# Patient Record
Sex: FEMALE | Race: WHITE | NOT HISPANIC OR LATINO | Employment: FULL TIME | URBAN - METROPOLITAN AREA
[De-identification: names, ages, dates, MRNs, and addresses within clinical notes are randomized per-mention and may not be internally consistent; named-entity substitution may affect disease eponyms.]

---

## 2018-03-02 ENCOUNTER — OFFICE VISIT (OUTPATIENT)
Dept: MEDICAL GROUP | Facility: LAB | Age: 23
End: 2018-03-02
Payer: COMMERCIAL

## 2018-03-02 VITALS
HEIGHT: 65 IN | SYSTOLIC BLOOD PRESSURE: 102 MMHG | BODY MASS INDEX: 26.69 KG/M2 | TEMPERATURE: 98 F | OXYGEN SATURATION: 96 % | WEIGHT: 160.2 LBS | HEART RATE: 76 BPM | RESPIRATION RATE: 14 BRPM | DIASTOLIC BLOOD PRESSURE: 68 MMHG

## 2018-03-02 DIAGNOSIS — B00.89 HERPETIC WHITLOW: ICD-10-CM

## 2018-03-02 DIAGNOSIS — R53.83 OTHER FATIGUE: ICD-10-CM

## 2018-03-02 DIAGNOSIS — G43.109 MIGRAINE WITH AURA AND WITHOUT STATUS MIGRAINOSUS, NOT INTRACTABLE: ICD-10-CM

## 2018-03-02 DIAGNOSIS — T78.40XD ALLERGIC REACTION TO DRUG, SUBSEQUENT ENCOUNTER: ICD-10-CM

## 2018-03-02 DIAGNOSIS — E55.9 VITAMIN D DEFICIENCY: ICD-10-CM

## 2018-03-02 DIAGNOSIS — E03.8 SUBCLINICAL HYPOTHYROIDISM: ICD-10-CM

## 2018-03-02 PROCEDURE — 99204 OFFICE O/P NEW MOD 45 MIN: CPT | Performed by: PHYSICIAN ASSISTANT

## 2018-03-02 RX ORDER — ACYCLOVIR 400 MG/1
400 TABLET ORAL 3 TIMES DAILY
Qty: 21 TAB | Refills: 3 | Status: SHIPPED | OUTPATIENT
Start: 2018-03-02 | End: 2018-03-09

## 2018-03-02 ASSESSMENT — PATIENT HEALTH QUESTIONNAIRE - PHQ9: CLINICAL INTERPRETATION OF PHQ2 SCORE: 0

## 2018-03-02 NOTE — ASSESSMENT & PLAN NOTE
Migraines started at age 10ish.   With aura.   Flares about 1 time monthly.   Treating with tylenol and advil.   Hx imitrex- CP and SOB.   Good at drinking water- almost 1 gal per day.   Denies tingling, numbness, muscle weakness.

## 2018-03-02 NOTE — ASSESSMENT & PLAN NOTE
Summer 2016 first noticed it. Blisters on right middle lateral finger and that finger and dorsal hand swollen with burning pain.   Has had 7-8 flares since then.   Has never treated them   Flares with more stress.   No hx of cold sores or herpes.

## 2018-03-02 NOTE — PROGRESS NOTES
Chief Complaint   Patient presents with   • Establish Care   • Thyroid Problem     lab results     HPI:   Veronica Reyes is a 23 y.o. female here to establish care and to discuss her thyroid and right finger.     Migraine with aura and without status migrainosus, not intractable  Migraines started at age 10ish.   With aura.   Flares about 1 time monthly.   Treating with tylenol and advil.   Hx imitrex- CP and SOB.   Good at drinking water- almost 1 gal per day.   Denies tingling, numbness, muscle weakness.     Herpetic karin  Summer 2016 first noticed it. Blisters on right middle lateral finger and that finger and dorsal hand swollen with burning pain.   Has had 7-8 flares since then.   Has never treated them   Flares with more stress.   No hx of cold sores or herpes.     Subclinical hypothyroidism  2/20/18 TSH 5.01, free T4 0.90  She stated she was asked to get labs done, she requested her thyroid be tested since she was fatigued sometimes and having a hard time losing weight.   Diet: pretty healthy, well balanced.   Exercise: 4 times weekly lots of cardio and has been for over 1 year.   Other labs included CBC, CMP and LP all normal.  She was asked to repeat her labs since there was an error.   And now on the lab she is not sure which one was repeated but she believes there was a mistake with her thyroid lab.   She is occasionally cold when others are not.   Denies diarrhea, constipation, intolerance to hot, rash, palpitation     Current medicines (including changes today)  Current Outpatient Prescriptions   Medication Sig Dispense Refill   • acyclovir (ZOVIRAX) 400 MG tablet Take 1 Tab by mouth 3 times a day for 7 days. 21 Tab 3     No current facility-administered medications for this visit.      She  has a past medical history of Acne; Chlamydia infection (8/15/11); and Migraine. She also has no past medical history of ASTHMA or Diabetes.  She  has no past surgical history on file.  Social History   Substance Use  "Topics   • Smoking status: Never Smoker   • Smokeless tobacco: Never Used   • Alcohol use Yes      Comment: 2 times weekly     Social History     Social History Narrative   • No narrative on file     Family History   Problem Relation Age of Onset   • Genetic Father      epilepsy/migraines   • Other Father      kidney stones   • Cancer Maternal Grandfather      skin cancer   • Cancer Paternal Grandfather      colon   • Heart Disease Paternal Grandfather      CAD   • Hypertension Paternal Grandfather    • Diabetes Neg Hx    • Stroke Neg Hx      Family Status   Relation Status   • Mother Alive   • Father Alive    seizures as a child   • Maternal Grandfather Alive   • Paternal Grandfather    • Maternal Grandmother    • Paternal Grandmother Alive   • Brother Alive   • Neg Hx        ROS  Constitutional: Negative for fever, chills, weight loss  HENT: Negative for ear pain, nosebleeds, congestion, sore throat and neck pain.   Eyes: Negative for blurred vision.   Respiratory: Negative for cough, sputum production, shortness of breath and wheezing.   Cardiovascular: Negative for chest pain, palpitations, orthopnea and leg swelling.   Gastrointestinal: Negative for heartburn, nausea, vomiting and abdominal pain.   Genitourinary: Negative for dysuria, urgency and frequency.   Musculoskeletal: Negative for myalgias, back pain and joint pain.   Skin: Negative for rash and itching.   Neurological: Negative for dizziness, tingling, tremors, sensory change, focal weakness and headaches.   Endo/Heme/Allergies: Does not bruise/bleed easily.   Psychiatric/Behavioral: Negative for depression, anxiety, or memory loss.   All other systems reviewed and are negative except as in HPI.     Objective:     Blood pressure 102/68, pulse 76, temperature 36.7 °C (98 °F), resp. rate 14, height 1.651 m (5' 5\"), weight 72.7 kg (160 lb 3.2 oz), last menstrual period 2018, SpO2 96 %, not currently breastfeeding. Body mass index is " 26.66 kg/m².  Physical Exam:    Constitutional: Alert, no distress.  Skin: Warm, dry, good turgor, right lateral middle finger with clustering of 1-2mm vesicals with edema in finger and on dorsal hand. No warmth, erythema, drainage, pustules.   Eye: PERRLA, conjunctiva clear, lids normal.  ENMT: Lips without lesions, good dentition, oropharynx clear.  Neck: Trachea midline, no masses, no thyromegaly.  Respiratory: Unlabored respiratory effort, lungs clear to auscultation, no wheezes, no ronchi.  Cardiovascular: Normal S1, S2, no murmur, no edema.  Abdomen: Soft, non-tender, no masses, no hepatosplenomegaly.  Psych: Alert and oriented x3, normal affect and mood.    Assessment and Plan:   The following treatment plan was discussed     1. Migraine with aura and without status migrainosus, not intractable  Stable, continue current treatment    3. Vitamin D deficiency  Labs ordered, will call for results  - VITAMIN D,25 HYDROXY; Future    4. Other fatigue  Uncertain etiology, we will repeat labs for thyroid function and vit D.   Otherwise discussed diet in depth and continue to exercise.     5. Subclinical hypothyroidism  New dx and pt would like repeat of labs. We will f/u after labs and if function is better we will discuss weight management options.   - TSH; Future  - FREE THYROXINE; Future    6. Herpetic karin  New dx, discussed etiology.   I was unable to get any fluid out to be tested.   We will try acyclovir although this episode may be too late to start treatment.   - acyclovir (ZOVIRAX) 400 MG tablet; Take 1 Tab by mouth 3 times a day for 7 days.  Dispense: 21 Tab; Refill: 3    Greater than 50% of 45 minutes was spent in face-to-face care and coordination regarding thyroid function, reviewing labs, rash on finger, migraines.     Records requested.  Followup: Return in about 4 weeks (around 3/30/2018) for labs and weight management.           Please note that this dictation was created using voice recognition  software. I have made every reasonable attempt to correct obvious errors, but I expect that there are errors of grammar and possibly content that I did not discover before finalizing the note.

## 2018-03-02 NOTE — ASSESSMENT & PLAN NOTE
2/20/18 TSH 5.01, free T4 0.90  She stated she was asked to get labs done, she requested her thyroid be tested since she was fatigued sometimes and having a hard time losing weight.   Diet: pretty healthy, well balanced.   Exercise: 4 times weekly lots of cardio and has been for over 1 year.   Other labs included CBC, CMP and LP all normal.  She was asked to repeat her labs since there was an error.   And now on the lab she is not sure which one was repeated but she believes there was a mistake with her thyroid lab.   She is occasionally cold when others are not.   Denies diarrhea, constipation, intolerance to hot, rash, palpitation

## 2018-03-03 LAB
25(OH)D3+25(OH)D2 SERPL-MCNC: 29.7 NG/ML (ref 30–100)
T4 FREE SERPL-MCNC: 1.17 NG/DL (ref 0.82–1.77)
TSH SERPL DL<=0.005 MIU/L-ACNC: 4.98 UIU/ML (ref 0.45–4.5)

## 2018-04-03 ENCOUNTER — HOSPITAL ENCOUNTER (OUTPATIENT)
Facility: MEDICAL CENTER | Age: 23
End: 2018-04-03
Attending: PHYSICIAN ASSISTANT
Payer: COMMERCIAL

## 2018-04-03 ENCOUNTER — OFFICE VISIT (OUTPATIENT)
Dept: MEDICAL GROUP | Facility: LAB | Age: 23
End: 2018-04-03
Payer: COMMERCIAL

## 2018-04-03 VITALS
SYSTOLIC BLOOD PRESSURE: 102 MMHG | DIASTOLIC BLOOD PRESSURE: 68 MMHG | HEART RATE: 72 BPM | OXYGEN SATURATION: 96 % | BODY MASS INDEX: 26.33 KG/M2 | RESPIRATION RATE: 12 BRPM | HEIGHT: 65 IN | WEIGHT: 158 LBS | TEMPERATURE: 98.7 F

## 2018-04-03 DIAGNOSIS — Z12.4 SCREENING FOR CERVICAL CANCER: ICD-10-CM

## 2018-04-03 DIAGNOSIS — Z23 NEED FOR HPV VACCINATION: ICD-10-CM

## 2018-04-03 DIAGNOSIS — Z11.8 SCREENING FOR CHLAMYDIAL DISEASE: ICD-10-CM

## 2018-04-03 DIAGNOSIS — Z01.419 ENCOUNTER FOR GYNECOLOGICAL EXAMINATION: ICD-10-CM

## 2018-04-03 PROCEDURE — 87591 N.GONORRHOEAE DNA AMP PROB: CPT

## 2018-04-03 PROCEDURE — 87491 CHLMYD TRACH DNA AMP PROBE: CPT

## 2018-04-03 PROCEDURE — 88175 CYTOPATH C/V AUTO FLUID REDO: CPT

## 2018-04-03 PROCEDURE — 99395 PREV VISIT EST AGE 18-39: CPT | Mod: 25 | Performed by: PHYSICIAN ASSISTANT

## 2018-04-03 PROCEDURE — 90651 9VHPV VACCINE 2/3 DOSE IM: CPT | Performed by: PHYSICIAN ASSISTANT

## 2018-04-03 PROCEDURE — 90471 IMMUNIZATION ADMIN: CPT | Performed by: PHYSICIAN ASSISTANT

## 2018-04-03 ASSESSMENT — PAIN SCALES - GENERAL: PAINLEVEL: NO PAIN

## 2018-04-03 NOTE — PROGRESS NOTES
SUBJECTIVE: 23 y.o. female for annual routine gynecologic exam  Chief Complaint   Patient presents with   • Gynecologic Exam       Obstetric History       T0      L0     SAB0   TAB0   Ectopic0   Molar0   Multiple0   Live Births0       Last Pap: 13  History   Sexual Activity   • Sexual activity: Yes   • Partners: Male   • Birth control/ protection: Condom     H/O Abnormal Pap no  She is sexually active with long time partner, she is not on birth control, they are not preventing pregnancy but not trying either.   She has not been taking prenatal vitamins.   LMP Date: 18     She  reports that she has never smoked. She has never used smokeless tobacco.      Allergies: Bactrim; Imitrex [sumatriptan succinate]; Morphine sulfate; and Tdap [dipth, acell pertus, tetanus]     ROS:    Menses every month with 5 days  Bleeding.   Cramping is mild.   She does not take OTC analgesics for cramping  No significant bloating/fluid retention, pelvic pain, or dyspareunia. No vaginal discharge   No breast tenderness, mass, nipple discharge, changes in size or contour, or abnormal cyclic discomfort.  Reports no menopause symptoms of hot flashes, night sweats, sleep disruption, mood changes.Denies vaginal dryness.   No urinary tract symptoms, no incontinence.   No abdominal pain, change in bowel habits, black or bloody stools.    No unusual headaches, no visual changes, menstrual migraines   No prolonged cough. No dyspnea or chest pain on exertion.  No depression, labile mood, anxiety, libido changes, insomnia.  No polydipsia, polyuria, temperature intolerance.  No new/concerning skin lesions, concerns.     Exercise: moderate regular exercise program  Preventive Care:  UTD per pt other than HPV.   HPV #1 13 (has not had #2)  Pap- 13    Current medicines (including changes today)  No current outpatient prescriptions on file.     No current facility-administered medications for this visit.      She  has a past  "medical history of Acne; Chlamydia infection (8/15/11); and Migraine. She also has no past medical history of ASTHMA or Diabetes.  She  has no past surgical history on file.     Family History:   Family History   Problem Relation Age of Onset   • Genetic Father      epilepsy/migraines   • Other Father      kidney stones   • Cancer Maternal Grandfather      skin cancer   • Cancer Paternal Grandfather      colon   • Heart Disease Paternal Grandfather      CAD   • Hypertension Paternal Grandfather    • Diabetes Neg Hx    • Stroke Neg Hx           OBJECTIVE:   /68   Pulse 72   Temp 37.1 °C (98.7 °F)   Resp 12   Ht 1.651 m (5' 5\")   Wt 71.7 kg (158 lb)   LMP 03/24/2018   SpO2 96%   Breastfeeding? No   BMI 26.29 kg/m²   Body mass index is 26.29 kg/m².    HEAD AND NECK:  Ears normal.  Throat, oral cavity and tongue normal.  Neck supple. No adenopathy or masses in the neck or supraclavicular regions.  No carotid bruits. No thyromegaly. NEURO: Cranial nerves are normal. DTR's normal and symmetric.  CHEST:  Clear, good air entry, no wheezes or rales. HEART:  Regular rate and rhythm.  S1 and S2 normal.  No edema or JVD. ABDOMEN:  Soft without tenderness, guarding, mass or organomegaly.  No CVA tenderness or inguinal adenopathy. EXTREMITIES:  Extremities, reflexes and peripheral pulses are normal. SKIN: color normal, vascularity normal, no edema, temperature normal   No rashes or suspicious skin lesions noted.     Breast Exam: Performed with instruction during examination. No axillary lymphadenopathy, no skin changes, no dominant masses. No nipple retraction  Pelvic Exam -  Normal external genitalia with no lesions. Normal vaginal mucosa with normal rugation and scant discharge. Cervix with no visible lesions. No cervical motion tenderness. Uterus is normal sized with no masses. No adnexal tenderness or enlargement appreciated. Thin Prep Pap is obtained, vaginal swab is obtained and specimen(s) sent to " lab    <ASSESSMENT and PLAN>    1. Encounter for gynecological examination  Pap smear with HPV cotesting collected today  UTD per pt other than HPV.   HPV #1 5/8/13 (has not had #2)  Pap- 5/9/13  Recommend taking prenatal vitamins since they are not preventing pregnancy.     2. Screening for cervical cancer  Pap smear with HPV cotesting collected today    3. Screening for chlamydial disease  Pap smear with HPV cotesting collected today    4. Need for HPV  HPV #2 given today but this was 5 years after she had #1.   The ACIP recommends that if the vaccination series is interrupted for any length of time, it can be resumed without restarting the series.    Discussed  STD prevention, HIV risk factors and prevention, feminine hygiene, family planning choices, adequate intake of calcium and vitamin D, diet and exercise   Follow-up in 1 years for next Gyn exam and 3 years for next Pap.   Next office visit for recheck of chronic medical conditions is due in  1 year    Please note that this dictation was created using voice recognition software. I have made every reasonable attempt to correct obvious errors, but I expect that there are errors of grammar and possibly content that I did not discover before finalizing the note.

## 2018-04-04 DIAGNOSIS — Z01.419 ENCOUNTER FOR GYNECOLOGICAL EXAMINATION: ICD-10-CM

## 2018-04-04 DIAGNOSIS — Z12.4 SCREENING FOR CERVICAL CANCER: ICD-10-CM

## 2018-04-04 DIAGNOSIS — Z11.8 SCREENING FOR CHLAMYDIAL DISEASE: ICD-10-CM

## 2018-04-05 LAB
C TRACH DNA GENITAL QL NAA+PROBE: NEGATIVE
CYTOLOGY REG CYTOL: NORMAL
N GONORRHOEA DNA GENITAL QL NAA+PROBE: NEGATIVE
SPECIMEN SOURCE: NORMAL

## 2018-10-05 ENCOUNTER — TELEPHONE (OUTPATIENT)
Dept: MEDICAL GROUP | Facility: LAB | Age: 23
End: 2018-10-05

## 2018-10-05 DIAGNOSIS — Z23 NEED FOR HPV VACCINE: ICD-10-CM

## 2018-10-30 ENCOUNTER — APPOINTMENT (OUTPATIENT)
Dept: MEDICAL GROUP | Facility: MEDICAL CENTER | Age: 23
End: 2018-10-30
Payer: COMMERCIAL

## 2018-12-03 RX ORDER — ACYCLOVIR 400 MG/1
TABLET ORAL
Qty: 21 TAB | Refills: 0 | Status: SHIPPED | OUTPATIENT
Start: 2018-12-03 | End: 2019-04-26

## 2019-01-11 ENCOUNTER — HOSPITAL ENCOUNTER (OUTPATIENT)
Facility: MEDICAL CENTER | Age: 24
End: 2019-01-11
Attending: PHYSICIAN ASSISTANT
Payer: COMMERCIAL

## 2019-01-11 ENCOUNTER — OFFICE VISIT (OUTPATIENT)
Dept: URGENT CARE | Facility: CLINIC | Age: 24
End: 2019-01-11
Payer: COMMERCIAL

## 2019-01-11 VITALS
OXYGEN SATURATION: 94 % | HEIGHT: 65 IN | RESPIRATION RATE: 16 BRPM | DIASTOLIC BLOOD PRESSURE: 68 MMHG | SYSTOLIC BLOOD PRESSURE: 116 MMHG | WEIGHT: 158 LBS | TEMPERATURE: 98.3 F | BODY MASS INDEX: 26.33 KG/M2 | HEART RATE: 78 BPM

## 2019-01-11 DIAGNOSIS — N30.00 ACUTE CYSTITIS WITHOUT HEMATURIA: ICD-10-CM

## 2019-01-11 LAB
APPEARANCE UR: CLEAR
BILIRUB UR STRIP-MCNC: NEGATIVE MG/DL
COLOR UR AUTO: YELLOW
GLUCOSE UR STRIP.AUTO-MCNC: NEGATIVE MG/DL
KETONES UR STRIP.AUTO-MCNC: NEGATIVE MG/DL
LEUKOCYTE ESTERASE UR QL STRIP.AUTO: NORMAL
NITRITE UR QL STRIP.AUTO: NEGATIVE
PH UR STRIP.AUTO: 7 [PH] (ref 5–8)
PROT UR QL STRIP: NEGATIVE MG/DL
RBC UR QL AUTO: NORMAL
SP GR UR STRIP.AUTO: 1.02
UROBILINOGEN UR STRIP-MCNC: 0.2 MG/DL

## 2019-01-11 PROCEDURE — 87077 CULTURE AEROBIC IDENTIFY: CPT

## 2019-01-11 PROCEDURE — 99214 OFFICE O/P EST MOD 30 MIN: CPT | Performed by: PHYSICIAN ASSISTANT

## 2019-01-11 PROCEDURE — 87086 URINE CULTURE/COLONY COUNT: CPT

## 2019-01-11 PROCEDURE — 87186 SC STD MICRODIL/AGAR DIL: CPT

## 2019-01-11 PROCEDURE — 81002 URINALYSIS NONAUTO W/O SCOPE: CPT | Performed by: PHYSICIAN ASSISTANT

## 2019-01-11 RX ORDER — NITROFURANTOIN 25; 75 MG/1; MG/1
100 CAPSULE ORAL EVERY 12 HOURS
Qty: 10 CAP | Refills: 0 | Status: SHIPPED | OUTPATIENT
Start: 2019-01-11 | End: 2019-01-16

## 2019-01-11 ASSESSMENT — ENCOUNTER SYMPTOMS
PALPITATIONS: 0
CHILLS: 0
FLANK PAIN: 0
BACK PAIN: 0
COUGH: 0
SHORTNESS OF BREATH: 0
FEVER: 0

## 2019-01-11 NOTE — PROGRESS NOTES
Subjective:      Veronica Reyes is a 23 y.o. female who presents with UTI (X2 days)            UTI   This is a new problem. The current episode started in the past 7 days. The problem occurs constantly. Associated symptoms include urinary symptoms. Pertinent negatives include no chest pain, chills, coughing or fever. She has tried NSAIDs for the symptoms. The treatment provided no relief.       Review of Systems   Constitutional: Negative for chills and fever.   Respiratory: Negative for cough and shortness of breath.    Cardiovascular: Negative for chest pain and palpitations.   Genitourinary: Positive for dysuria, frequency and urgency. Negative for flank pain and hematuria.   Musculoskeletal: Negative for back pain.   All other systems reviewed and are negative.    PMH:  has a past medical history of Acne; Chlamydia infection (8/15/11); and Migraine. She also has no past medical history of ASTHMA or Diabetes.  MEDS:   Current Outpatient Prescriptions:   •  nitrofurantoin monohydr macro (MACROBID) 100 MG Cap, Take 1 Cap by mouth every 12 hours for 5 days., Disp: 10 Cap, Rfl: 0  •  acyclovir (ZOVIRAX) 400 MG tablet, TAKE 1 TAB BY MOUTH 3 TIMES A DAY FOR 7 DAYS. (Patient not taking: Reported on 1/11/2019), Disp: 21 Tab, Rfl: 0  ALLERGIES:   Allergies   Allergen Reactions   • Bactrim Vomiting     hepatitis   • Imitrex [Sumatriptan Succinate]    • Morphine Sulfate Rash and Itching   • Tdap [Dipth, Acell Pertus, Tetanus]      seizure     SURGHX: History reviewed. No pertinent surgical history.  SOCHX:  reports that she has never smoked. She has never used smokeless tobacco. She reports that she drinks alcohol. She reports that she does not use drugs.  FH: Family history was reviewed, no pertinent findings to report  Medications, Allergies, and current problem list reviewed today in Epic       Objective:     /68 (BP Location: Left arm, Patient Position: Sitting, BP Cuff Size: Adult)   Pulse 78   Temp 36.8 °C (98.3  "°F) (Temporal)   Resp 16   Ht 1.651 m (5' 5\")   Wt 71.7 kg (158 lb)   SpO2 94%   BMI 26.29 kg/m²      Physical Exam   Constitutional: She is oriented to person, place, and time. She appears well-developed and well-nourished.   HENT:   Head: Normocephalic and atraumatic.   Right Ear: External ear normal.   Left Ear: External ear normal.   Nose: Nose normal.   Mouth/Throat: Oropharynx is clear and moist.   Neck: Normal range of motion. Neck supple.   Cardiovascular: Normal rate, regular rhythm and normal heart sounds.    Pulmonary/Chest: Effort normal and breath sounds normal.   Abdominal: Soft.   Musculoskeletal: She exhibits no tenderness.   No CVA tenderness present.   Neurological: She is alert and oriented to person, place, and time. Abnormal reflex: .dc.   Skin: Skin is warm and dry.   Psychiatric: She has a normal mood and affect. Her behavior is normal. Judgment and thought content normal.   Vitals reviewed.              Assessment/Plan:     1. Acute cystitis without hematuria    - POCT Urinalysis  - Urine Culture; Future  - nitrofurantoin monohydr macro (MACROBID) 100 MG Cap; Take 1 Cap by mouth every 12 hours for 5 days.  Dispense: 10 Cap; Refill: 0    Differential diagnosis, natural history, supportive care discussed. Follow-up with primary care provider within 7-10 days, emergency room precautions discussed.  Patient and/or family appears understanding of information.  Handout and review of patients diagnosis and treatment was discussed extensively.     "

## 2019-01-14 LAB
BACTERIA UR CULT: ABNORMAL
BACTERIA UR CULT: ABNORMAL
SIGNIFICANT IND 70042: ABNORMAL
SITE SITE: ABNORMAL
SOURCE SOURCE: ABNORMAL

## 2019-02-28 ENCOUNTER — OFFICE VISIT (OUTPATIENT)
Dept: URGENT CARE | Facility: PHYSICIAN GROUP | Age: 24
End: 2019-02-28
Payer: COMMERCIAL

## 2019-02-28 VITALS
DIASTOLIC BLOOD PRESSURE: 64 MMHG | HEIGHT: 65 IN | HEART RATE: 88 BPM | RESPIRATION RATE: 14 BRPM | BODY MASS INDEX: 24.99 KG/M2 | TEMPERATURE: 98.2 F | SYSTOLIC BLOOD PRESSURE: 104 MMHG | OXYGEN SATURATION: 98 % | WEIGHT: 150 LBS

## 2019-02-28 DIAGNOSIS — R05.9 COUGH: ICD-10-CM

## 2019-02-28 DIAGNOSIS — H92.01 RIGHT EAR PAIN: ICD-10-CM

## 2019-02-28 DIAGNOSIS — H69.91 EUSTACHIAN TUBE DYSFUNCTION, RIGHT: ICD-10-CM

## 2019-02-28 DIAGNOSIS — J31.0 NON-ALLERGIC RHINITIS: ICD-10-CM

## 2019-02-28 DIAGNOSIS — R09.82 PND (POST-NASAL DRIP): ICD-10-CM

## 2019-02-28 DIAGNOSIS — J02.9 SORE THROAT: ICD-10-CM

## 2019-02-28 LAB
INT CON NEG: NEGATIVE
INT CON POS: POSITIVE
S PYO AG THROAT QL: NORMAL

## 2019-02-28 PROCEDURE — 87880 STREP A ASSAY W/OPTIC: CPT | Performed by: NURSE PRACTITIONER

## 2019-02-28 PROCEDURE — 99213 OFFICE O/P EST LOW 20 MIN: CPT | Performed by: NURSE PRACTITIONER

## 2019-02-28 ASSESSMENT — ENCOUNTER SYMPTOMS
EYE DISCHARGE: 0
NAUSEA: 0
NECK PAIN: 0
COUGH: 1
FEVER: 0
HEADACHES: 0
SHORTNESS OF BREATH: 0
VOMITING: 0
EYE REDNESS: 0
WEAKNESS: 0
ABDOMINAL PAIN: 0
SORE THROAT: 1
MYALGIAS: 0
CHILLS: 0
DIZZINESS: 0
WHEEZING: 0
CONSTIPATION: 0
DIARRHEA: 0

## 2019-02-28 NOTE — LETTER
February 28, 2019       Patient: Veronica Reyes   YOB: 1995   Date of Visit: 2/28/2019         To Whom It May Concern:    It is my medical opinion that Veronica Reyes be excused from absences this week due to illness.    If you have any questions or concerns, please don't hesitate to call [phone]          Sincerely,          SOLANGE Peralta.  Electronically Signed

## 2019-03-18 ENCOUNTER — TELEPHONE (OUTPATIENT)
Dept: MEDICAL GROUP | Facility: PHYSICIAN GROUP | Age: 24
End: 2019-03-18

## 2019-03-18 NOTE — TELEPHONE ENCOUNTER
Please fit this patient in for of 40 or 60-minute  Establish patient slot that I have March 25 on March 26, please call patient and put her in preferably in a 60-minute time slot in the next 4 weeks.

## 2019-03-18 NOTE — TELEPHONE ENCOUNTER
----- Message from Karrie Bryant sent at 3/18/2019  1:08 PM PDT -----  Regarding: Access patient unable to get in  Hi There;    This is the daughter of a co-worker who needs to get an appointment with you if possible in the next couple weeks.    Rhea

## 2019-04-10 ENCOUNTER — OFFICE VISIT (OUTPATIENT)
Dept: MEDICAL GROUP | Facility: PHYSICIAN GROUP | Age: 24
End: 2019-04-10
Payer: COMMERCIAL

## 2019-04-10 VITALS
TEMPERATURE: 98.5 F | HEART RATE: 67 BPM | DIASTOLIC BLOOD PRESSURE: 64 MMHG | HEIGHT: 65 IN | BODY MASS INDEX: 26.46 KG/M2 | OXYGEN SATURATION: 100 % | WEIGHT: 158.8 LBS | RESPIRATION RATE: 12 BRPM | SYSTOLIC BLOOD PRESSURE: 116 MMHG

## 2019-04-10 DIAGNOSIS — E55.9 VITAMIN D DEFICIENCY: ICD-10-CM

## 2019-04-10 DIAGNOSIS — R53.83 FATIGUE, UNSPECIFIED TYPE: ICD-10-CM

## 2019-04-10 DIAGNOSIS — Z51.81 MEDICATION MONITORING ENCOUNTER: ICD-10-CM

## 2019-04-10 DIAGNOSIS — Z11.3 ROUTINE SCREENING FOR STI (SEXUALLY TRANSMITTED INFECTION): ICD-10-CM

## 2019-04-10 DIAGNOSIS — B00.89 HERPETIC WHITLOW: ICD-10-CM

## 2019-04-10 DIAGNOSIS — Z86.19 HISTORY OF HEPATITIS: ICD-10-CM

## 2019-04-10 DIAGNOSIS — E03.8 SUBCLINICAL HYPOTHYROIDISM: ICD-10-CM

## 2019-04-10 DIAGNOSIS — E53.8 VITAMIN B12 DEFICIENCY: ICD-10-CM

## 2019-04-10 DIAGNOSIS — E78.5 DYSLIPIDEMIA: ICD-10-CM

## 2019-04-10 PROCEDURE — 99214 OFFICE O/P EST MOD 30 MIN: CPT | Performed by: FAMILY MEDICINE

## 2019-04-10 ASSESSMENT — PATIENT HEALTH QUESTIONNAIRE - PHQ9: CLINICAL INTERPRETATION OF PHQ2 SCORE: 0

## 2019-04-10 NOTE — PATIENT INSTRUCTIONS
Diagnoses and all orders for this visit:    Herpetic karin    Subclinical hypothyroidism    BMI 26.0-26.9,adult    Routine screening for STI (sexually transmitted infection)  -     HEPATITIS PANEL ACUTE(4 COMPONENTS); Future  -     HIV AG/AB COMBO ASSAY SCREENING; Future  -     MISCELLANEOUS TEST; Future  -     Chlamydia/GC PCR Urine Or Swab; Future    History of hepatitis    Fatigue, unspecified type  -     CBC WITH DIFFERENTIAL; Future  -     TSH WITH REFLEX TO FT4; Future    Vitamin D deficiency  -     VITAMIN D,25 HYDROXY; Future    Vitamin B12 deficiency  -     VITAMIN B12; Future    Dyslipidemia  -     Lipid Profile; Future    Medication monitoring encounter  -     Comp Metabolic Panel; Future    -Please get fasting lab work 8 hours of no eating but drink plenty of water.  STD screening will also be done.  Will wait to refill acyclovir as patient will think about it as she is try to get pregnant and will consider either suppression daily low-dose acyclovir therapy for her many outbreaks of herpetic karin but has been 5 months since her last outbreak or will just take as needed when she has an outbreak and she has been counseled how this works during pregnancy and periods of stress.  No oral or genital regions of herpes per patient.  History of hepatitis after taking Bactrim which resolved and no ongoing liver issues and will get hepatitis screen.  She cannot take Tdap vaccine due to seizures in the past.  She has gotten the first dose of HPV but will check with her insurance and give her the other ones as indicated.  If you are pregnant then wait until after pregnancy to do your HPV series or can do before you get pregnant unless you are planning to get pregnant.  Patient instruction information given about the HPV vaccine for her to read.  On her follow-up will fill out her [employer] insurance form with lab results and other information that she will bring at her next visit for me to fill out for her  insurance wellness report.  Will be doing her Pap test next week which she scheduled and bring her menstrual calendar.  This not need a pregnancy test today and will let me know at the next visit if she does.  Discussed and will continue and healthy diet and exercise and work on continued health and wellness for weight and possible planned pregnancy.    Return in about 6 days (around 4/16/2019), or lab FU, for Well Women Check with Pap.    HPV (Human Papillomavirus) Vaccine: What You Need to Know  1. Why get vaccinated?  HPV vaccine prevents infection with human papillomavirus (HPV) types that are associated with many cancers, including:  · cervical cancer in females,  · vaginal and vulvar cancers in females,  · anal cancer in females and males,  · throat cancer in females and males, and  · penile cancer in males.  In addition, HPV vaccine prevents infection with HPV types that cause genital warts in both females and males.  In the [place] about 12,000 women get cervical cancer every year, and about 4,000 women die from it. HPV vaccine can prevent most of these cases of cervical cancer.  Vaccination is not a substitute for cervical cancer screening. This vaccine does not protect against all HPV types that can cause cervical cancer. Women should still get regular Pap tests.   HPV infection usually comes from sexual contact, and most people will become infected at some point in their life. About 14 million Americans, including teens, get infected every year. Most infections will go away on their own and not cause serious problems. But thousands of women and men get cancer and other diseases from HPV.  2. HPV vaccine  HPV vaccine is approved by FDA and is recommended by CDC for both males and females. It is routinely given at 11 or 12 years of age, but it may be given beginning at age 9 years through age 26 years.  Most adolescents 9 through 14 years of age should get HPV vaccine as a two-dose series with the doses   by 6-12 months. People who start HPV vaccination at 15 years of age and older should get the vaccine as a three-dose series with the second dose given 1-2 months after the first dose and the third dose given 6 months after the first dose. There are several exceptions to these age recommendations. Your health care provider can give you more information.  3. Some people should not get this vaccine  · Anyone who has had a severe (life-threatening) allergic reaction to a dose of HPV vaccine should not get another dose.  · Anyone who has a severe (life threatening) allergy to any component of HPV vaccine should not get the vaccine.  · Tell your doctor if you have any severe allergies that you know of, including a severe allergy to yeast.  · HPV vaccine is not recommended for pregnant women. If you learn that you were pregnant when you were vaccinated, there is no reason to expect any problems for you or your baby. Any woman who learns she was pregnant when she got HPV vaccine is encouraged to contact the 's registry for HPV vaccination during pregnancy at 1-234.690.2955. Women who are breastfeeding may be vaccinated.  · If you have a mild illness, such as a cold, you can probably get the vaccine today. If you are moderately or severely ill, you should probably wait until you recover. Your doctor can advise you.  4. Risks of a vaccine reaction  With any medicine, including vaccines, there is a chance of side effects. These are usually mild and go away on their own, but serious reactions are also possible.  Most people who get HPV vaccine do not have any serious problems with it.  Mild or moderate problems following HPV vaccine:  · Reactions in the arm where the shot was given:  ¨ Soreness (about 9 people in 10)  ¨ Redness or swelling (about 1 person in 3)  · Fever:  ¨ Mild (100°F) (about 1 person in 10)  ¨ Moderate (102°F) (about 1 person in 65)  · Other problems:  ¨ Headache (about 1 person in  3)  Problems that could happen after any injected vaccine:  · People sometimes faint after a medical procedure, including vaccination. Sitting or lying down for about 15 minutes can help prevent fainting, and injuries caused by a fall. Tell your doctor if you feel dizzy, or have vision changes or ringing in the ears.  · Some people get severe pain in the shoulder and have difficulty moving the arm where a shot was given. This happens very rarely.  · Any medication can cause a severe allergic reaction. Such reactions from a vaccine are very rare, estimated at about 1 in a million doses, and would happen within a few minutes to a few hours after the vaccination.  As with any medicine, there is a very remote chance of a vaccine causing a serious injury or death.  The safety of vaccines is always being monitored. For more information, visit: www.cdc.gov/vaccinesafety/.  5. What if there is a serious reaction?  What should I look for?  Look for anything that concerns you, such as signs of a severe allergic reaction, very high fever, or unusual behavior.  Signs of a severe allergic reaction can include hives, swelling of the face and throat, difficulty breathing, a fast heartbeat, dizziness, and weakness. These would usually start a few minutes to a few hours after the vaccination.  What should I do?  If you think it is a severe allergic reaction or other emergency that can't wait, call 9-1-1 or get to the nearest hospital. Otherwise, call your doctor.  Afterward, the reaction should be reported to the Vaccine Adverse Event Reporting System (VAERS). Your doctor should file this report, or you can do it yourself through the VAERS web site at www.vaers.hhs.gov, or by calling 1-812.475.2861.  VAERS does not give medical advice.  6. The National Vaccine Injury Compensation Program  The National Vaccine Injury Compensation Program (VICP) is a federal program that was created to compensate people who may have been injured by  certain vaccines.  Persons who believe they may have been injured by a vaccine can learn about the program and about filing a claim by calling 1-121.524.5190 or visiting the [facility] website at [email or web address]/vaccinecompensation. There is a time limit to file a claim for compensation.  7. How can I learn more?  · Ask your health care provider. He or she can give you the vaccine package insert or suggest other sources of information.  · Call your local or state health department.  · Contact the Centers for Disease Control and Prevention (CDC):  ¨ Call 1-618.504.9748 (1-185-LQV-INFO) or  ¨ Visit CDC’s website at www.cdc.gov/hpv  Vaccine Information Statement, HPV Vaccine (12/02/2016)  This information is not intended to replace advice given to you by your health care provider. Make sure you discuss any questions you have with your health care provider.

## 2019-04-17 LAB
25(OH)D3+25(OH)D2 SERPL-MCNC: 23.7 NG/ML (ref 30–100)
ALBUMIN SERPL-MCNC: 4.3 G/DL (ref 3.5–5.5)
ALBUMIN/GLOB SERPL: 1.5 {RATIO} (ref 1.2–2.2)
ALP SERPL-CCNC: 57 IU/L (ref 39–117)
ALT SERPL-CCNC: 14 IU/L (ref 0–32)
AST SERPL-CCNC: 16 IU/L (ref 0–40)
BASOPHILS # BLD AUTO: 0 X10E3/UL (ref 0–0.2)
BASOPHILS NFR BLD AUTO: 0 %
BILIRUB SERPL-MCNC: 0.4 MG/DL (ref 0–1.2)
BUN SERPL-MCNC: 11 MG/DL (ref 6–20)
BUN/CREAT SERPL: 14 (ref 9–23)
C TRACH RRNA SPEC QL NAA+PROBE: NEGATIVE
CALCIUM SERPL-MCNC: 9.1 MG/DL (ref 8.7–10.2)
CHLORIDE SERPL-SCNC: 109 MMOL/L (ref 96–106)
CHOLEST SERPL-MCNC: 163 MG/DL (ref 100–199)
CO2 SERPL-SCNC: 18 MMOL/L (ref 20–29)
CREAT SERPL-MCNC: 0.81 MG/DL (ref 0.57–1)
EOSINOPHIL # BLD AUTO: 0.2 X10E3/UL (ref 0–0.4)
EOSINOPHIL NFR BLD AUTO: 3 %
ERYTHROCYTE [DISTWIDTH] IN BLOOD BY AUTOMATED COUNT: 13 % (ref 12.3–15.4)
GLOBULIN SER CALC-MCNC: 2.8 G/DL (ref 1.5–4.5)
GLUCOSE SERPL-MCNC: 91 MG/DL (ref 65–99)
HAV IGM SERPL QL IA: NEGATIVE
HBV CORE IGM SERPL QL IA: NEGATIVE
HBV SURFACE AG SERPL QL IA: NEGATIVE
HCT VFR BLD AUTO: 41.5 % (ref 34–46.6)
HCV AB S/CO SERPL IA: <0.1 S/CO RATIO (ref 0–0.9)
HDLC SERPL-MCNC: 54 MG/DL
HGB BLD-MCNC: 14.8 G/DL (ref 11.1–15.9)
HIV 1+2 AB+HIV1 P24 AG SERPL QL IA: NON REACTIVE
IMM GRANULOCYTES # BLD AUTO: 0 X10E3/UL (ref 0–0.1)
IMM GRANULOCYTES NFR BLD AUTO: 0 %
IMMATURE CELLS  115398: NORMAL
LABORATORY COMMENT REPORT: ABNORMAL
LDLC SERPL CALC-MCNC: 100 MG/DL (ref 0–99)
LYMPHOCYTES # BLD AUTO: 2 X10E3/UL (ref 0.7–3.1)
LYMPHOCYTES NFR BLD AUTO: 25 %
MCH RBC QN AUTO: 30.7 PG (ref 26.6–33)
MCHC RBC AUTO-ENTMCNC: 35.7 G/DL (ref 31.5–35.7)
MCV RBC AUTO: 86 FL (ref 79–97)
MONOCYTES # BLD AUTO: 0.6 X10E3/UL (ref 0.1–0.9)
MONOCYTES NFR BLD AUTO: 7 %
MORPHOLOGY BLD-IMP: NORMAL
N GONORRHOEA RRNA SPEC QL NAA+PROBE: NEGATIVE
NEUTROPHILS # BLD AUTO: 5.2 X10E3/UL (ref 1.4–7)
NEUTROPHILS NFR BLD AUTO: 65 %
NRBC BLD AUTO-RTO: NORMAL %
PLATELET # BLD AUTO: 236 X10E3/UL (ref 150–379)
POTASSIUM SERPL-SCNC: 4.3 MMOL/L (ref 3.5–5.2)
PROT SERPL-MCNC: 7.1 G/DL (ref 6–8.5)
RBC # BLD AUTO: 4.82 X10E6/UL (ref 3.77–5.28)
SODIUM SERPL-SCNC: 143 MMOL/L (ref 134–144)
T PALLIDUM AB SER QL IF: NON REACTIVE
TRIGL SERPL-MCNC: 43 MG/DL (ref 0–149)
TSH SERPL DL<=0.005 MIU/L-ACNC: 2.8 UIU/ML (ref 0.45–4.5)
VIT B12 SERPL-MCNC: 629 PG/ML (ref 232–1245)
VLDLC SERPL CALC-MCNC: 9 MG/DL (ref 5–40)
WBC # BLD AUTO: 8.1 X10E3/UL (ref 3.4–10.8)

## 2019-04-26 ENCOUNTER — OFFICE VISIT (OUTPATIENT)
Dept: URGENT CARE | Facility: CLINIC | Age: 24
End: 2019-04-26
Payer: COMMERCIAL

## 2019-04-26 VITALS
HEIGHT: 65 IN | TEMPERATURE: 97.2 F | DIASTOLIC BLOOD PRESSURE: 78 MMHG | HEART RATE: 92 BPM | WEIGHT: 158 LBS | OXYGEN SATURATION: 96 % | RESPIRATION RATE: 16 BRPM | BODY MASS INDEX: 26.33 KG/M2 | SYSTOLIC BLOOD PRESSURE: 120 MMHG

## 2019-04-26 DIAGNOSIS — H10.9 BACTERIAL CONJUNCTIVITIS OF RIGHT EYE: ICD-10-CM

## 2019-04-26 PROCEDURE — 99214 OFFICE O/P EST MOD 30 MIN: CPT | Performed by: NURSE PRACTITIONER

## 2019-04-26 RX ORDER — CIPROFLOXACIN HYDROCHLORIDE 3.5 MG/ML
1 SOLUTION/ DROPS TOPICAL 4 TIMES DAILY
Qty: 1 BOTTLE | Refills: 0 | Status: SHIPPED | OUTPATIENT
Start: 2019-04-26 | End: 2019-06-27

## 2019-04-26 ASSESSMENT — ENCOUNTER SYMPTOMS
MUSCULOSKELETAL NEGATIVE: 1
BLURRED VISION: 0
DOUBLE VISION: 0
ABDOMINAL PAIN: 0
SORE THROAT: 0
DIARRHEA: 0
WHEEZING: 0
EYE REDNESS: 1
COUGH: 0
NAUSEA: 0
VOMITING: 0
STRIDOR: 0
PHOTOPHOBIA: 0
EYE DISCHARGE: 1
CHILLS: 0
HEADACHES: 0
EYE PAIN: 0
DIZZINESS: 0
CONSTIPATION: 0
WEAKNESS: 0
FEVER: 0
PALPITATIONS: 0

## 2019-04-26 NOTE — PROGRESS NOTES
Subjective:   Veronica Reyes is a 24 y.o. female who presents for Eye Problem (Today right eye redness and discharge)        Conjunctivitis   This is a new (Right eye) problem. The current episode started today. The problem occurs constantly. The problem has been gradually worsening. Associated symptoms include congestion. Pertinent negatives include no abdominal pain, chest pain, chills, coughing, fever, headaches, nausea, rash, sore throat, vomiting or weakness. Associated symptoms comments: Denies recent trauma or changes to vision. Nothing aggravates the symptoms. She has tried nothing for the symptoms. The treatment provided no relief.        Review of Systems   Constitutional: Negative for chills and fever.   HENT: Positive for congestion. Negative for ear discharge, ear pain and sore throat.    Eyes: Positive for discharge and redness. Negative for blurred vision, double vision, photophobia and pain.   Respiratory: Negative for cough, wheezing and stridor.    Cardiovascular: Negative for chest pain and palpitations.   Gastrointestinal: Negative for abdominal pain, constipation, diarrhea, nausea and vomiting.   Musculoskeletal: Negative.    Skin: Negative.  Negative for itching and rash.   Neurological: Negative for dizziness, weakness and headaches.   All other systems reviewed and are negative.    PMH:  has a past medical history of Acne; Chlamydia infection (8/15/11); Hepatitis (2017); Herpetic karin (2016); Migraine; Thyroid disease; and Urinary tract infection. She also has no past medical history of Anxiety; Arrhythmia; ASTHMA; Blood transfusion without reported diagnosis; Cancer (HCC); CHF (congestive heart failure) (HCC); Clotting disorder (HCC); COPD (chronic obstructive pulmonary disease) (HCC); Depression; Diabetes; Heart attack (HCC); Heart murmur; Hyperlipidemia; Hypertension; IBD (inflammatory bowel disease); Kidney disease; Seizure (HCC); Stroke (HCC); or Substance abuse (HCC).  MEDS:   Current  "Outpatient Prescriptions:   •  ciprofloxacin (CILOXIN) 0.3 % Solution, Place 1 Drop in right eye 4 times a day., Disp: 1 Bottle, Rfl: 0  ALLERGIES:   Allergies   Allergen Reactions   • Bactrim Vomiting     hepatitis   • Imitrex [Sumatriptan Succinate]    • Morphine Sulfate Rash and Itching   • Tdap [Dipth, Acell Pertus, Tetanus]      seizure     SURGHX: History reviewed. No pertinent surgical history.  SOCHX:  reports that she has never smoked. She has never used smokeless tobacco. She reports that she drinks alcohol. She reports that she uses drugs, including Marijuana.  FH: Family history was reviewed, no pertinent findings to report     Objective:   /78 (BP Location: Right arm, Patient Position: Sitting, BP Cuff Size: Adult)   Pulse 92   Temp 36.2 °C (97.2 °F) (Temporal)   Resp 16   Ht 1.656 m (5' 5.2\")   Wt 71.7 kg (158 lb)   SpO2 96%   BMI 26.13 kg/m²   Physical Exam   Constitutional: She is oriented to person, place, and time. She appears well-developed and well-nourished. No distress.   HENT:   Head: Normocephalic.   Right Ear: Hearing, tympanic membrane and ear canal normal. Tympanic membrane is not erythematous. No middle ear effusion.   Left Ear: Hearing, tympanic membrane and ear canal normal. Tympanic membrane is not erythematous.  No middle ear effusion.   Nose: No rhinorrhea. Right sinus exhibits no maxillary sinus tenderness and no frontal sinus tenderness. Left sinus exhibits no maxillary sinus tenderness and no frontal sinus tenderness.   Mouth/Throat: Oropharynx is clear and moist and mucous membranes are normal.   Eyes: Pupils are equal, round, and reactive to light. EOM and lids are normal.   Right eye with conjunctival redness and yellow creamy white discharge noted.    Neck: Normal range of motion. No thyromegaly present.   Cardiovascular: Normal rate, regular rhythm and normal heart sounds.    Pulmonary/Chest: Effort normal and breath sounds normal. No respiratory distress. She " has no wheezes.   Lymphadenopathy:        Head (right side): No submandibular and no tonsillar adenopathy present.        Head (left side): No submandibular and no tonsillar adenopathy present.   Neurological: She is alert and oriented to person, place, and time.   Skin: Skin is warm and dry. She is not diaphoretic.   Psychiatric: She has a normal mood and affect. Her behavior is normal. Judgment and thought content normal.   Vitals reviewed.        Assessment/Plan:   Assessment    1. Bacterial conjunctivitis of right eye  - ciprofloxacin (CILOXIN) 0.3 % Solution; Place 1 Drop in right eye 4 times a day.  Dispense: 1 Bottle; Refill: 0    We discussed proper eye drop hygiene. May use eye drops in other eye if symptoms develop. Discussed to stop wearing contacts and to throw away contacts and makeup until infection has cleared. If symptoms persist, follow up with Opthalmologist or Optometrist.    Differential diagnosis, natural history, supportive care, and indications for immediate follow-up discussed.

## 2019-04-29 ENCOUNTER — HOSPITAL ENCOUNTER (OUTPATIENT)
Facility: MEDICAL CENTER | Age: 24
End: 2019-04-29
Attending: FAMILY MEDICINE
Payer: COMMERCIAL

## 2019-04-29 ENCOUNTER — OFFICE VISIT (OUTPATIENT)
Dept: MEDICAL GROUP | Facility: PHYSICIAN GROUP | Age: 24
End: 2019-04-29
Payer: COMMERCIAL

## 2019-04-29 VITALS
RESPIRATION RATE: 12 BRPM | HEIGHT: 65 IN | HEART RATE: 83 BPM | SYSTOLIC BLOOD PRESSURE: 118 MMHG | OXYGEN SATURATION: 99 % | BODY MASS INDEX: 26.66 KG/M2 | TEMPERATURE: 98.3 F | WEIGHT: 160 LBS | DIASTOLIC BLOOD PRESSURE: 88 MMHG

## 2019-04-29 DIAGNOSIS — Z23 NEED FOR VACCINATION: ICD-10-CM

## 2019-04-29 DIAGNOSIS — E78.5 DYSLIPIDEMIA: ICD-10-CM

## 2019-04-29 DIAGNOSIS — Z01.419 WELL FEMALE EXAM WITH ROUTINE GYNECOLOGICAL EXAM: ICD-10-CM

## 2019-04-29 DIAGNOSIS — J02.9 SORE THROAT: ICD-10-CM

## 2019-04-29 DIAGNOSIS — R53.81 MALAISE AND FATIGUE: ICD-10-CM

## 2019-04-29 DIAGNOSIS — R53.83 MALAISE AND FATIGUE: ICD-10-CM

## 2019-04-29 DIAGNOSIS — B00.89 HERPETIC WHITLOW: ICD-10-CM

## 2019-04-29 DIAGNOSIS — N89.8 VAGINAL DISCHARGE: ICD-10-CM

## 2019-04-29 DIAGNOSIS — E55.9 VITAMIN D DEFICIENCY: ICD-10-CM

## 2019-04-29 DIAGNOSIS — H10.31 ACUTE BACTERIAL CONJUNCTIVITIS OF RIGHT EYE: ICD-10-CM

## 2019-04-29 DIAGNOSIS — Z12.4 SCREENING FOR CERVICAL CANCER: ICD-10-CM

## 2019-04-29 LAB
FLUAV+FLUBV AG SPEC QL IA: NEGATIVE
HETEROPH AB SER QL LA: NEGATIVE
INT CON NEG: NEGATIVE
INT CON POS: POSITIVE
S PYO AG THROAT QL: NEGATIVE

## 2019-04-29 PROCEDURE — 87510 GARDNER VAG DNA DIR PROBE: CPT

## 2019-04-29 PROCEDURE — 87804 INFLUENZA ASSAY W/OPTIC: CPT | Performed by: FAMILY MEDICINE

## 2019-04-29 PROCEDURE — 99395 PREV VISIT EST AGE 18-39: CPT | Mod: 25 | Performed by: FAMILY MEDICINE

## 2019-04-29 PROCEDURE — 87880 STREP A ASSAY W/OPTIC: CPT | Performed by: FAMILY MEDICINE

## 2019-04-29 PROCEDURE — 87480 CANDIDA DNA DIR PROBE: CPT

## 2019-04-29 PROCEDURE — 88175 CYTOPATH C/V AUTO FLUID REDO: CPT

## 2019-04-29 PROCEDURE — 90471 IMMUNIZATION ADMIN: CPT | Performed by: FAMILY MEDICINE

## 2019-04-29 PROCEDURE — 87070 CULTURE OTHR SPECIMN AEROBIC: CPT

## 2019-04-29 PROCEDURE — 86308 HETEROPHILE ANTIBODY SCREEN: CPT | Performed by: FAMILY MEDICINE

## 2019-04-29 PROCEDURE — 87624 HPV HI-RISK TYP POOLED RSLT: CPT

## 2019-04-29 PROCEDURE — 90651 9VHPV VACCINE 2/3 DOSE IM: CPT | Performed by: FAMILY MEDICINE

## 2019-04-29 PROCEDURE — 87660 TRICHOMONAS VAGIN DIR PROBE: CPT

## 2019-04-29 RX ORDER — ACYCLOVIR 400 MG/1
400 TABLET ORAL 3 TIMES DAILY
Qty: 30 TAB | Refills: 0 | Status: SHIPPED | OUTPATIENT
Start: 2019-04-29 | End: 2019-06-27

## 2019-04-29 RX ORDER — ERGOCALCIFEROL 1.25 MG/1
CAPSULE ORAL
Qty: 12 CAP | Refills: 0 | Status: SHIPPED | OUTPATIENT
Start: 2019-04-29 | End: 2019-06-27

## 2019-04-30 DIAGNOSIS — N89.8 VAGINAL DISCHARGE: ICD-10-CM

## 2019-04-30 DIAGNOSIS — R53.83 MALAISE AND FATIGUE: ICD-10-CM

## 2019-04-30 DIAGNOSIS — Z12.4 SCREENING FOR CERVICAL CANCER: ICD-10-CM

## 2019-04-30 DIAGNOSIS — Z01.419 WELL FEMALE EXAM WITH ROUTINE GYNECOLOGICAL EXAM: ICD-10-CM

## 2019-04-30 DIAGNOSIS — J02.9 SORE THROAT: ICD-10-CM

## 2019-04-30 DIAGNOSIS — R53.81 MALAISE AND FATIGUE: ICD-10-CM

## 2019-04-30 LAB
CANDIDA DNA VAG QL PROBE+SIG AMP: NEGATIVE
G VAGINALIS DNA VAG QL PROBE+SIG AMP: NEGATIVE
T VAGINALIS DNA VAG QL PROBE+SIG AMP: NEGATIVE

## 2019-04-30 NOTE — PROGRESS NOTES
cc: Sore throat, malaise and fatigue, right pinkeye, well woman exam with Pap and breast exam    Subjective:     Veronica Reyes is a 24 y.o. female presenting Sore throat, malaise and fatigue, right pinkeye, well woman exam with Pap and breast exam she reports she is been having sore throat and malaise and fatigue for the last 8 days since her nephew has been visiting her who also had pinkeye and she went to urgent care for her pinkeye and was given ciprofloxacin for her eye which has improved some but still bit painful and she reports that she went to urgent care she did not have any throat swabs done then.  She reports some problems with some blurriness with her right eye now too.  April 26 for the back to her conjunctivitis she was given Cipro 1 drop to place and her eye 4 times she feels that drying out her eyes.  April 13 she got lab work before she was sick and her complete blood count was within normal limits, complete metabolic panel was within normal limits, lipid panel within normal limits but LDL just borderline high at 100, hepatitis panel was negative, chlamydia and gonorrhea was negative, vitamin D was low at 23.7, HIV screen was negative, TSH thyroid was normal, vitamin B12 was normal at 629, and syphilis screen was negative.  Had one HPV vaccine long time ago but never completed the series and would like one today.  Has not had outbreak of her herpetic with low on her right third digit but would like to have some acyclovir in case she gets outbreaks and would just want to do as needed for now and not suppression therapy she does want to get pregnant sometimes in the near future but not right now.  She is never had abnormal Pap or HPV in the past.  She denies any breast issues or breast concerns.  She denies any pregnancies or miscarriage.  April 15/19 was her LMP and they have been regular.  She is a non-smoker.  She had her mother had cervical cancer but no history of breast cancer or ovarian cancer or  "endometrial cancer that she knows of.  Went through her lab work and she is sexually active and her STD screening has been negative.  Is normal vaginal discharge that is white.  No urinary tract infections recently or yeast infections or bacterial vaginosis.  She is just using condoms right now and no birth control pill right now.    Review of systems:     Constitutional: Negative for fever, chills and positive fatigue.  Positive malaise  HENT: Negative for sinus pressure, negative for ear pain or hearing loss, positive pinkeye in right eye  Eyes: Positive for mild blurriness in the right eye and pink right eye, negative for double vision  Respiratory: Negative for cough and shortness of breath, negative for exertional shortness of breath, positive sore throat  Cardiovascular: Negative for leg swelling, negative for palpitations, negative for chest pain  Gastrointestinal: Negative for nausea, vomiting, abdominal pain, constipation and diarrhea.  Genitourinary: Negative for dysuria and hematuria.   Skin: Negative for rash.   Neurological: Negative for dizziness, focal weakness and headaches.   Endo/Heme/Allergies: Denies bleeding, bruising, and recurrent allergies.          Current Outpatient Prescriptions:   •  ergocalciferol (DRISDOL) 10958 UNIT capsule, Take one tab po once a week., Disp: 12 Cap, Rfl: 0  •  acyclovir (ZOVIRAX) 400 MG tablet, Take 1 Tab by mouth 3 times a day., Disp: 30 Tab, Rfl: 0  •  ciprofloxacin (CILOXIN) 0.3 % Solution, Place 1 Drop in right eye 4 times a day., Disp: 1 Bottle, Rfl: 0    Allergies, past medical history, past surgical history, family history, social history reviewed and updated    Objective:     Vitals: /88 (BP Location: Left arm, Patient Position: Sitting, BP Cuff Size: Adult)   Pulse 83   Temp 36.8 °C (98.3 °F) (Temporal)   Resp 12   Ht 1.651 m (5' 5\")   Wt 72.6 kg (160 lb)   LMP 04/15/2019 (Exact Date)   SpO2 99%   Breastfeeding? No   BMI 26.63 kg/m²   General: " Alert, pleasant, NAD  HEENT: Normocephalic.  Nontraumatic. EOMI, no icterus or pallor.  Conjunctivae of right eye red. External ears normal. Oropharynx erythematous with some white exudate, mucous membranes moist.  Neck supple.  No thyromegaly or masses palpated.  Positive cervical or negative supraclavicular lymphadenopathy.  Heart: Regular rate and rhythm.  S1 and S2 normal.  No murmurs appreciated.  Respiratory: Normal respiratory effort.  Clear to auscultation bilaterally.  Abdomen: Non-distended, soft, non tender in all 4 quadrants.  Skin: Warm, dry, no rashes.  Musculoskeletal: Gait is normal.  Moves all extremities well.  Extremities: No leg edema.  Pedal pulses 2+ symmetric.   Psych:  Affect/mood is normal, judgement is good, memory is intact, grooming is appropriate.   examined with chaperone ÁNGEL Esquivel with friability of cervical mucosa mild bloody spotting that went away and mild white fishy discharge, no other bumps or lesions vulva or labia or cervix and pelvic exam and breast exam bilateral with no mass, bumps or lesions in bilateral breasts or nipple discharge.    Assessment/Plan:     Diagnoses and all orders for this visit:    Well female exam with routine gynecological exam  -     VAGINAL PATHOGENS DNA PANEL; Future  -     THINPREP PAP WITH HPV; Future    Screening for cervical cancer  -     THINPREP PAP WITH HPV; Future    Sore throat  -     POCT Rapid Strep A  -     POCT Mononucleosis (mono)  -     POCT Influenza A/B  -     CULTURE THROAT; Future    Malaise and fatigue  -     POCT Rapid Strep A  -     POCT Mononucleosis (mono)  -     POCT Influenza A/B  -     CULTURE THROAT; Future    Acute bacterial conjunctivitis of right eye    Vitamin D deficiency  -     ergocalciferol (DRISDOL) 00028 UNIT capsule; Take one tab po once a week.    Need for vaccination  -     9VHPV Vaccine 2-3 Dose IM    Herpetic karin  -     acyclovir (ZOVIRAX) 400 MG tablet; Take 1 Tab by mouth 3 times a  day.    Vaginal discharge  -     VAGINAL PATHOGENS DNA PANEL; Future    Dyslipidemia    -Pap test done with HPV and vaginal pathogen swab and she is not having any vaginal issues but did see some vaginal discharge on her exam and will let her know what that shows or if it is normal.  Please do high-dose vitamin D for at least 3 months and then can recheck this vitamin D lab as needed and switch to low-dose.  We will get her HPV vaccine today and then will need 2 more doses as a nurse only visit.  Acyclovir refilled to take 400 mg 3 times daily for 5 to 10 days for recurrence of this but not having any issues right now.  Went over lab work as above complete blood count was within normal limits, complete metabolic panel was within normal limits, lipid panel within normal limits but LDL just borderline high at 100, hepatitis panel was negative, chlamydia and gonorrhea was negative, vitamin D was low at 23.7, HIV screen was negative, TSH thyroid was normal, vitamin B12 was normal at 629, and syphilis screen was negative.  Very borderline LDL and will recommend to try over-the-counter niacin to take daily for this and fish oil twice a day with meals omega-3 fatty acids.  Continue Cipro eyedrops and because the eye it is not getting better would recommend to see the optometrist tomorrow or as soon as possible especially if having any vision blurriness or issues and do not wear any make-up and change her make-up thank you and do not wear any contacts.  Also discussed with patient that her POC influenza/mono and strep test was negative but we will send for throat culture since she has history of throat and for now will gargle with yellow Listerine mouthwash twice a day and warm salt water gargle 3 or 4 times a day stay hydrated and drink plenty of water and can do herbal teas with turmeric, cinnamon, ginger, lemon, cloves, mint for her sore throat.  We will call with the results of the well woman exam of the HPV and Pap and  vaginal pathogen swab.  Breast examination was within normal limits and no lumps or irregularity felt in no need for further screening for this and can just do self breast exams at home monthly.  Will recommend to wait to get pregnant until the infection clears and until he get all HPV vaccines.  2 more HPV vaccines left.  Patient is not complaining of any vaginal discharge but some seen will wait for vaginal pathogen to see if anything needs to be treated as it was mild.  ER precautions given if any blinding in the right eye to go to the ER or call 911.by the optometrist ASAP    Return if symptoms worsen or fail to improve, for Annual Wellness Exam/Nurse only HPV #2 of #3.

## 2019-04-30 NOTE — PATIENT INSTRUCTIONS
Diagnoses and all orders for this visit:    Well female exam with routine gynecological exam  -     VAGINAL PATHOGENS DNA PANEL; Future  -     THINPREP PAP WITH HPV; Future    Screening for cervical cancer  -     THINPREP PAP WITH HPV; Future    Sore throat  -     POCT Rapid Strep A  -     POCT Mononucleosis (mono)  -     POCT Influenza A/B  -     CULTURE THROAT; Future    Malaise and fatigue  -     POCT Rapid Strep A  -     POCT Mononucleosis (mono)  -     POCT Influenza A/B  -     CULTURE THROAT; Future    Acute bacterial conjunctivitis of right eye    Vitamin D deficiency  -     ergocalciferol (DRISDOL) 82705 UNIT capsule; Take one tab po once a week.    Need for vaccination  -     9VHPV Vaccine 2-3 Dose IM    Herpetic karin  -     acyclovir (ZOVIRAX) 400 MG tablet; Take 1 Tab by mouth 3 times a day.    Vaginal discharge  -     VAGINAL PATHOGENS DNA PANEL; Future    Dyslipidemia    -Pap test done with HPV and vaginal pathogen swab and she is not having any vaginal issues but did see some vaginal discharge on her exam and will let her know what that shows or if it is normal.  Please do high-dose vitamin D for at least 3 months and then can recheck this vitamin D lab as needed and switch to low-dose.  We will get her HPV vaccine today and then will need 2 more doses as a nurse only visit.  Acyclovir refilled to take 400 mg 3 times daily for 5 to 10 days for recurrence of this but not having any issues right now.  Went over lab work as above complete blood count was within normal limits, complete metabolic panel was within normal limits, lipid panel within normal limits but LDL just borderline high at 100, hepatitis panel was negative, chlamydia and gonorrhea was negative, vitamin D was low at 23.7, HIV screen was negative, TSH thyroid was normal, vitamin B12 was normal at 629, and syphilis screen was negative.  Very borderline LDL and will recommend to try over-the-counter niacin to take daily for this and fish  "oil twice a day with meals omega-3 fatty acids.  Continue eyedrops and because the eye it is not getting better would recommend to see the optometrist tomorrow or as soon as possible especially if having any vision blurriness or issues and do not wear any make-up and change her make-up thank you and do not wear any contacts.  Also discussed with patient that her POC influenza/mono and strep test was negative but we will send for throat culture since she has history of throat and for now will gargle with yellow Listerine mouthwash twice a day and warm salt water gargle 3 or 4 times a day stay hydrated and drink plenty of water and can do herbal teas with turmeric, cinnamon, frances, lemon, cloves, mint for her sore throat.  We will call with the results of the well woman exam of the HPV and Pap and vaginal pathogen swab.  Breast examination was within normal limits and no lumps or irregularity felt in no need for further screening for this and can just do self breast exams at home monthly.  Will recommend to wait to get pregnant until the infection clears and until he get all HPV vaccines.  2 more HPV vaccines left.    Return if symptoms worsen or fail to improve, for Annual Wellness Exam.    Conjunctivitis  Conjunctivitis is commonly called \"pink eye.\" Conjunctivitis can be caused by bacterial or viral infection, allergies, or injuries. There is usually redness of the lining of the eye, itching, discomfort, and sometimes discharge. There may be deposits of matter along the eyelids. A viral infection usually causes a watery discharge, while a bacterial infection causes a yellowish, thick discharge. Pink eye is very contagious and spreads by direct contact.  You may be given antibiotic eyedrops as part of your treatment. Before using your eye medicine, remove all drainage from the eye by washing gently with warm water and cotton balls. Continue to use the medication until you have awakened 2 mornings in a row without " discharge from the eye. Do not rub your eye. This increases the irritation and helps spread infection. Use separate towels from other household members. Wash your hands with soap and water before and after touching your eyes. Use cold compresses to reduce pain and sunglasses to relieve irritation from light. Do not wear contact lenses or wear eye makeup until the infection is gone.  SEEK MEDICAL CARE IF:   · Your symptoms are not better after 3 days of treatment.  · You have increased pain or trouble seeing.  · The outer eyelids become very red or swollen.      Viral Pharyngitis  Viral pharyngitis is a viral infection that produces redness, pain, and swelling (inflammation) of the throat. It can spread from person to person (contagious).   CAUSES  Viral pharyngitis is caused by inhaling a large amount of certain germs called viruses. Many different viruses cause viral pharyngitis.  SYMPTOMS  Symptoms of viral pharyngitis include:  · Sore throat.  · Tiredness.  · Stuffy nose.  · Low-grade fever.  · Congestion.  · Cough.  TREATMENT  Treatment includes rest, drinking plenty of fluids, and the use of over-the-counter medication (approved by your caregiver).  HOME CARE INSTRUCTIONS   · Drink enough fluids to keep your urine clear or pale yellow.  · Eat soft, cold foods such as ice cream, frozen ice pops, or gelatin dessert.  · Gargle with warm salt water (1 tsp salt per 1 qt of water).  · If over age 7, throat lozenges may be used safely.  · Only take over-the-counter or prescription medicines for pain, discomfort, or fever as directed by your caregiver. Do not take aspirin.  To help prevent spreading viral pharyngitis to others, avoid:  · Mouth-to-mouth contact with others.  · Sharing utensils for eating and drinking.  · Coughing around others.  SEEK MEDICAL CARE IF:   · You are better in a few days, then become worse.  · You have a fever or pain not helped by pain medicines.  · There are any other changes that concern  you.

## 2019-05-01 LAB
CYTOLOGY REG CYTOL: NORMAL
HPV HR 12 DNA CVX QL NAA+PROBE: NEGATIVE
HPV16 DNA SPEC QL NAA+PROBE: NEGATIVE
HPV18 DNA SPEC QL NAA+PROBE: NEGATIVE
SPECIMEN SOURCE: NORMAL

## 2019-05-02 LAB
BACTERIA SPEC RESP CULT: NORMAL
SIGNIFICANT IND 70042: NORMAL
SITE SITE: NORMAL
SOURCE SOURCE: NORMAL

## 2019-05-03 ENCOUNTER — TELEPHONE (OUTPATIENT)
Dept: MEDICAL GROUP | Facility: PHYSICIAN GROUP | Age: 24
End: 2019-05-03

## 2019-05-03 NOTE — TELEPHONE ENCOUNTER
1. Caller Name: Veronica                                           Call Back Number: [phone] (home)       Patient approves a detailed voicemail message: N\A    LVM for pt to call the office to relay results.

## 2019-05-03 NOTE — TELEPHONE ENCOUNTER
----- Message from Bushra Rock M.D. sent at 5/2/2019  6:31 PM PDT -----  Please call patient and let patient know throat culture was negative and only showed normal bacterial respiratory growth and no other concerns and yeast, BV, and trichomoniasis vaginal swab was negative, thank you.

## 2019-06-27 ENCOUNTER — APPOINTMENT (OUTPATIENT)
Dept: RADIOLOGY | Facility: MEDICAL CENTER | Age: 24
End: 2019-06-27
Attending: EMERGENCY MEDICINE
Payer: COMMERCIAL

## 2019-06-27 ENCOUNTER — HOSPITAL ENCOUNTER (EMERGENCY)
Facility: MEDICAL CENTER | Age: 24
End: 2019-06-27
Attending: EMERGENCY MEDICINE
Payer: COMMERCIAL

## 2019-06-27 VITALS
BODY MASS INDEX: 26.48 KG/M2 | RESPIRATION RATE: 16 BRPM | OXYGEN SATURATION: 97 % | HEART RATE: 68 BPM | DIASTOLIC BLOOD PRESSURE: 73 MMHG | HEIGHT: 65 IN | TEMPERATURE: 97.3 F | SYSTOLIC BLOOD PRESSURE: 106 MMHG | WEIGHT: 158.95 LBS

## 2019-06-27 DIAGNOSIS — O20.0 THREATENED MISCARRIAGE IN EARLY PREGNANCY: ICD-10-CM

## 2019-06-27 LAB
APPEARANCE UR: CLEAR
B-HCG SERPL-ACNC: ABNORMAL MIU/ML (ref 0–10)
BACTERIA #/AREA URNS HPF: ABNORMAL /HPF
BACTERIA GENITAL QL WET PREP: NORMAL
BASOPHILS # BLD AUTO: 0.6 % (ref 0–1.8)
BASOPHILS # BLD: 0.06 K/UL (ref 0–0.12)
BILIRUB UR QL STRIP.AUTO: NEGATIVE
C TRACH DNA SPEC QL NAA+PROBE: NEGATIVE
COLOR UR: YELLOW
EOSINOPHIL # BLD AUTO: 0.13 K/UL (ref 0–0.51)
EOSINOPHIL NFR BLD: 1.3 % (ref 0–6.9)
EPI CELLS #/AREA URNS HPF: ABNORMAL /HPF
ERYTHROCYTE [DISTWIDTH] IN BLOOD BY AUTOMATED COUNT: 39.9 FL (ref 35.9–50)
GLUCOSE UR STRIP.AUTO-MCNC: NEGATIVE MG/DL
HCT VFR BLD AUTO: 45 % (ref 37–47)
HGB BLD-MCNC: 15.7 G/DL (ref 12–16)
IMM GRANULOCYTES # BLD AUTO: 0.07 K/UL (ref 0–0.11)
IMM GRANULOCYTES NFR BLD AUTO: 0.7 % (ref 0–0.9)
KETONES UR STRIP.AUTO-MCNC: NEGATIVE MG/DL
LEUKOCYTE ESTERASE UR QL STRIP.AUTO: NEGATIVE
LYMPHOCYTES # BLD AUTO: 2.01 K/UL (ref 1–4.8)
LYMPHOCYTES NFR BLD: 19.7 % (ref 22–41)
MCH RBC QN AUTO: 30.4 PG (ref 27–33)
MCHC RBC AUTO-ENTMCNC: 34.9 G/DL (ref 33.6–35)
MCV RBC AUTO: 87.2 FL (ref 81.4–97.8)
MICRO URNS: ABNORMAL
MONOCYTES # BLD AUTO: 0.76 K/UL (ref 0–0.85)
MONOCYTES NFR BLD AUTO: 7.4 % (ref 0–13.4)
N GONORRHOEA DNA SPEC QL NAA+PROBE: NEGATIVE
NEUTROPHILS # BLD AUTO: 7.18 K/UL (ref 2–7.15)
NEUTROPHILS NFR BLD: 70.3 % (ref 44–72)
NITRITE UR QL STRIP.AUTO: NEGATIVE
NRBC # BLD AUTO: 0 K/UL
NRBC BLD-RTO: 0 /100 WBC
NUMBER OF RH DOSES IND 8505RD: NORMAL
PH UR STRIP.AUTO: 5.5 [PH]
PLATELET # BLD AUTO: 248 K/UL (ref 164–446)
PMV BLD AUTO: 10.2 FL (ref 9–12.9)
PROT UR QL STRIP: NEGATIVE MG/DL
RBC # BLD AUTO: 5.16 M/UL (ref 4.2–5.4)
RBC # URNS HPF: ABNORMAL /HPF
RBC UR QL AUTO: ABNORMAL
RH BLD: NORMAL
SIGNIFICANT IND 70042: NORMAL
SITE SITE: NORMAL
SOURCE SOURCE: NORMAL
SP GR UR STRIP.AUTO: <=1.005
SPECIMEN SOURCE: NORMAL
WBC # BLD AUTO: 10.2 K/UL (ref 4.8–10.8)

## 2019-06-27 PROCEDURE — 76801 OB US < 14 WKS SINGLE FETUS: CPT

## 2019-06-27 PROCEDURE — 99284 EMERGENCY DEPT VISIT MOD MDM: CPT

## 2019-06-27 PROCEDURE — 87591 N.GONORRHOEAE DNA AMP PROB: CPT

## 2019-06-27 PROCEDURE — 87491 CHLMYD TRACH DNA AMP PROBE: CPT

## 2019-06-27 PROCEDURE — 36415 COLL VENOUS BLD VENIPUNCTURE: CPT

## 2019-06-27 PROCEDURE — 81001 URINALYSIS AUTO W/SCOPE: CPT

## 2019-06-27 PROCEDURE — 84702 CHORIONIC GONADOTROPIN TEST: CPT

## 2019-06-27 PROCEDURE — 85025 COMPLETE CBC W/AUTO DIFF WBC: CPT

## 2019-06-27 PROCEDURE — 86901 BLOOD TYPING SEROLOGIC RH(D): CPT

## 2019-06-27 RX ORDER — NITROFURANTOIN 25; 75 MG/1; MG/1
100 CAPSULE ORAL 2 TIMES DAILY
Qty: 14 CAP | Refills: 0 | Status: SHIPPED | OUTPATIENT
Start: 2019-06-27 | End: 2019-07-04

## 2019-06-27 NOTE — ED PROVIDER NOTES
"ED Provider Note    CHIEF COMPLAINT  Vaginal Bleeding    HPI  Veronica Reyes is a 24 y.o. G 1 at 6 weeks by dates who presents with vaginal bleeding.  No fertility therapy.  Patient was doing fine until last night when she had some heavy bright red bleeding similar to menstrual bleeding.  She also had associated cramping, bilateral in the lower pelvis, sharper than she would typically experience with a cycle.  Denies any other vaginal discharge.  No change in bowel or bladder.  No fever.  No other belly pain.  Nothing makes it better or worse.  Does not radiate.  No associated nausea vomiting.  There is no other complaint.    PAST MEDICAL HISTORY  Past Medical History:   Diagnosis Date   • Acne    • Chlamydia infection 8/15/11   • Dyslipidemia 4/29/2019   • Hepatitis 2017    Developed after Bactrim and imporved on fluids.   • Herpetic karin 2016    R 3rd digit for last 3 years.-many reoccurances a year   • Migraine    • Thyroid disease    • Urinary tract infection        SOCIAL HISTORY  Social History   Substance Use Topics   • Smoking status: Never Smoker   • Smokeless tobacco: Never Used   • Alcohol use No      Comment: 2 times weekly       Here with mother and her fiancé.    CURRENT MEDICATIONS    I have reviewed the nurses notes and/or the list brought with the patient.    ALLERGIES  Allergies   Allergen Reactions   • Bactrim Vomiting     hepatitis   • Imitrex [Sumatriptan Succinate]    • Morphine Sulfate Rash and Itching   • Tdap [Dipth, Acell Pertus, Tetanus]      seizure       REVIEW OF SYSTEMS  See HPI for further details. Review of systems as above, otherwise all other systems are negative.     PHYSICAL EXAM  VITAL SIGNS: /74   Pulse 80   Temp 36.1 °C (96.9 °F) (Temporal)   Resp 18   Ht 1.651 m (5' 5\")   Wt 72.1 kg (158 lb 15.2 oz)   LMP 05/17/2019 (Exact Date)   SpO2 97%   BMI 26.45 kg/m²     Constitutional: Well appearing patient in no acute distress.  Not toxic, nor ill in appearance.  HENT: " Mucus membranes moist.  Oropharynx is clear.  Eyes: Pupils equally round.  No scleral icterus.   Neck: Full nontender range of motion.  Cardiovascular: Regular heart rate and rhythm.  No murmurs, rubs, nor gallop appreciated.   Thorax & Lungs: Lungs are clear to auscultation with good air movement bilaterally.  No wheeze or other adventitious breath sounds.   Abdomen: Soft, with no tenderness, rebound nor guarding.  No mass, pulsatile mass, nor hepatosplenomegaly appreciated.  No CVA tenderness.  : Susan, technician as chaperone.  EGBUS normal.  Vagina notable for some older appearing blood in the vault.  Cervix closed, nontender.  Adnexa: Without mass or tenderness.  Skin: No purpura nor petechia noted.  Extremities/Musculoskeletal: No sign of trauma.   Neurologic: Alert & oriented.  Strength and sensation grossly intact all around.  Gait is normal.  Psychiatric: Affect appropriate for the clinical situation.    LABS  Labs Reviewed   CBC WITH DIFFERENTIAL - Abnormal; Notable for the following:        Result Value    Lymphocytes 19.70 (*)     Neutrophils (Absolute) 7.18 (*)     All other components within normal limits   URINALYSIS,CULTURE IF INDICATED - Abnormal; Notable for the following:     Occult Blood Moderate (*)     All other components within normal limits   HCG QUANTITATIVE - Abnormal; Notable for the following:     Bhcg 41899.0 (*)     All other components within normal limits   URINE MICROSCOPIC (W/UA) - Abnormal; Notable for the following:     Bacteria Few (*)     All other components within normal limits   RH TYPE FOR RHOGAM FROM E.D.    Narrative:     Print Consent?->No   WET PREP   CHLAMYDIA/GC PCR URINE OR SWAB       GC/Chlamydia sent and pending although I do not think it is the etiology of her symptoms.    RADIOLOGY/PROCEDURES  I have reviewed the patient's film interpretation myself, and it is read out as:    US-OB 1ST TRIMESTER WITH TRANSVAGINAL (COMBO)   Final Result      1.  Intrauterine  gestational sac with yolk sac      2.  This could be early intrauterine pregnancy versus anembryonic gestation      3.  2.9 cm simple left ovarian cyst            MEDICAL RECORD  I have reviewed patient's medical record and pertinent results are listed above.    COURSE & MEDICAL DECISION MAKING  I have reviewed any medical record information, laboratory studies and radiographic results as noted above.  This patient comes in with vaginal bleeding.   She is pregnant.  As noted above, there is a gestational sac intrauterine but no embryo yet.  At this point, could be a problem such as an embryonic gestation, also could be early pregnancy.  For this reason like to have the patient rechecked in 3 days time, but this is a Sunday, I recommended that she come back to the ER.  Specifically like to see her downtown more we have gynecology.  I did call and speak with Dr. Aponte on-call for Dr. Motta, who she plans to see for this pregnancy.  She agrees with the plan.  Should there be any new symptoms return for the worse in the meantime, I recommend she follow-up downtown.  This was discussed with the patient, her mom and her fiancé.  Instructions on threatened miscarriage.      FINAL IMPRESSION  1. Threatened miscarriage in early pregnancy           This dictation was created using voice recognition software.    Electronically signed by: Sameer Cancino, 6/27/2019 11:24 AM

## 2019-06-27 NOTE — ED NOTES
Medication Reconciliation updated and complete per pt at bedside  Allergies have been verified and updated   No oral ABX within the last 14 days  Pt Home Pharmacy:[facility]

## 2019-06-27 NOTE — ED NOTES
Patient in stable condition. No signs of distress. Patient pain free. Patient ambulatory out of ED to personal vehicle with stable gait with family.

## 2019-11-14 ENCOUNTER — PATIENT MESSAGE (OUTPATIENT)
Dept: MEDICAL GROUP | Facility: PHYSICIAN GROUP | Age: 24
End: 2019-11-14

## 2019-11-14 DIAGNOSIS — B00.89 HERPETIC WHITLOW: ICD-10-CM

## 2019-11-14 RX ORDER — ACYCLOVIR 400 MG/1
400 TABLET ORAL 3 TIMES DAILY
Qty: 15 TAB | Refills: 2 | Status: SHIPPED | OUTPATIENT
Start: 2019-11-14 | End: 2019-12-20 | Stop reason: SDUPTHER

## 2019-11-14 NOTE — TELEPHONE ENCOUNTER
Was the patient seen in the last year in this department? Yes    Does patient have an active prescription for medications requested? No     Received Request Via: Pharmacy      Pt met protocol?: Yes   Has taken this medication in the past for herpetic karin   Lab Results   Component Value Date/Time    SODIUM 143 04/13/2019 09:46 AM    SODIUM 134 (L) 02/05/2014 04:25 AM    POTASSIUM 4.3 04/13/2019 09:46 AM    POTASSIUM 4.0 02/05/2014 04:25 AM    CHLORIDE 109 (H) 04/13/2019 09:46 AM    CHLORIDE 104 02/05/2014 04:25 AM    CO2 18 (L) 04/13/2019 09:46 AM    CO2 23 02/05/2014 04:25 AM    GLUCOSE 91 04/13/2019 09:46 AM    GLUCOSE 100 (H) 02/05/2014 04:25 AM    BUN 11 04/13/2019 09:46 AM    BUN 7 (L) 02/05/2014 04:25 AM    CREATININE 0.81 04/13/2019 09:46 AM    CREATININE 0.66 02/05/2014 04:25 AM    CREATININE 0.7 05/11/2006 12:45 PM    BUNCREATRAT 14 04/13/2019 09:46 AM

## 2019-11-15 ENCOUNTER — PATIENT MESSAGE (OUTPATIENT)
Dept: MEDICAL GROUP | Facility: PHYSICIAN GROUP | Age: 24
End: 2019-11-15

## 2019-11-15 RX ORDER — ACYCLOVIR 400 MG/1
TABLET ORAL
Refills: 0 | OUTPATIENT
Start: 2019-11-15

## 2019-11-15 NOTE — PATIENT COMMUNICATION
Please let patient know 400 mg of acyclovir to take every 8 hours so 3 times a day to take for 5 days is sent to the pharmacy with 2 refills.  And please let the patient know that if she is having more than 3 outbreaks a year of the herpes then please make an appointment to see me and we could consider and talk about suppression therapy otherwise I will send this medication with 2 refills for her to refill whenever she needs and to take within 72 hours of her outbreak or as soon as possible.  Thanks.

## 2019-11-15 NOTE — PROGRESS NOTES
Patient's request for herpes outbreak refill of 400 mg to take 3 times daily for 5 days for recurrence of herpes with 2 refills are sent to her pharmacy and if she is getting more than 3 outbreaks a year then she should make a follow-up appointment with me to discuss possible suppression therapy.

## 2019-11-18 NOTE — PATIENT COMMUNICATION
Please let patient know, congratulations and all the best with your pregnancy. Also I am aware of why you are using the acyclovir as documented in the last visit, but this is a something I let all patient's know in the future. I already sent acyclovir to your pharmacy with refills. But please let your OBGYN know about the herpetic karin if you already have not, just so they are aware of your medications. Also while you are pregnant, in the future, please get medications or management from them to have this approved, but I already sent the medication and it is okay and safe to take this medication while pregnant as I have done prenatal care in the past but no longer do prenatal care, so please just update your OBGYN at your follow up visit with them. Thanks and all the best again!

## 2019-12-20 DIAGNOSIS — B00.89 HERPETIC WHITLOW: ICD-10-CM

## 2019-12-23 RX ORDER — ACYCLOVIR 400 MG/1
400 TABLET ORAL 3 TIMES DAILY
Qty: 15 TAB | Refills: 2 | Status: SHIPPED | OUTPATIENT
Start: 2019-12-23 | End: 2020-04-29

## 2020-04-28 DIAGNOSIS — B00.89 HERPETIC WHITLOW: ICD-10-CM

## 2020-04-29 NOTE — TELEPHONE ENCOUNTER
Was the patient seen in the last year in this department? No    Does patient have an active prescription for medications requested? No     Received Request Via: Pharmacy      Pt met protocol?: No, OV 4/19, last prescribed 12/19 for herpetic karin

## 2020-04-30 RX ORDER — ACYCLOVIR 400 MG/1
TABLET ORAL
Qty: 15 TAB | Refills: 0 | Status: SHIPPED | OUTPATIENT
Start: 2020-04-30 | End: 2021-11-04

## 2021-05-12 ENCOUNTER — OFFICE VISIT (OUTPATIENT)
Dept: URGENT CARE | Facility: CLINIC | Age: 26
End: 2021-05-12
Payer: COMMERCIAL

## 2021-05-12 ENCOUNTER — HOSPITAL ENCOUNTER (OUTPATIENT)
Facility: MEDICAL CENTER | Age: 26
End: 2021-05-12
Attending: NURSE PRACTITIONER
Payer: COMMERCIAL

## 2021-05-12 VITALS
RESPIRATION RATE: 16 BRPM | OXYGEN SATURATION: 99 % | SYSTOLIC BLOOD PRESSURE: 116 MMHG | BODY MASS INDEX: 26.63 KG/M2 | TEMPERATURE: 98.2 F | WEIGHT: 160 LBS | HEART RATE: 80 BPM | DIASTOLIC BLOOD PRESSURE: 74 MMHG

## 2021-05-12 DIAGNOSIS — N30.01 ACUTE CYSTITIS WITH HEMATURIA: ICD-10-CM

## 2021-05-12 DIAGNOSIS — R39.15 URINARY URGENCY: ICD-10-CM

## 2021-05-12 DIAGNOSIS — Z11.3 SCREEN FOR SEXUALLY TRANSMITTED DISEASES: ICD-10-CM

## 2021-05-12 DIAGNOSIS — R30.0 DYSURIA: ICD-10-CM

## 2021-05-12 DIAGNOSIS — R35.0 URINARY FREQUENCY: ICD-10-CM

## 2021-05-12 LAB
APPEARANCE UR: NORMAL
BILIRUB UR STRIP-MCNC: NEGATIVE MG/DL
COLOR UR AUTO: YELLOW
GLUCOSE UR STRIP.AUTO-MCNC: NEGATIVE MG/DL
KETONES UR STRIP.AUTO-MCNC: NEGATIVE MG/DL
LEUKOCYTE ESTERASE UR QL STRIP.AUTO: NORMAL
NITRITE UR QL STRIP.AUTO: NEGATIVE
PH UR STRIP.AUTO: 5 [PH] (ref 5–8)
PROT UR QL STRIP: NEGATIVE MG/DL
RBC UR QL AUTO: NORMAL
SP GR UR STRIP.AUTO: >=1.03
UROBILINOGEN UR STRIP-MCNC: 0.2 MG/DL

## 2021-05-12 PROCEDURE — 87186 SC STD MICRODIL/AGAR DIL: CPT

## 2021-05-12 PROCEDURE — 87086 URINE CULTURE/COLONY COUNT: CPT

## 2021-05-12 PROCEDURE — 99214 OFFICE O/P EST MOD 30 MIN: CPT | Performed by: NURSE PRACTITIONER

## 2021-05-12 PROCEDURE — 87077 CULTURE AEROBIC IDENTIFY: CPT | Mod: 91

## 2021-05-12 PROCEDURE — 87591 N.GONORRHOEAE DNA AMP PROB: CPT

## 2021-05-12 PROCEDURE — 87491 CHLMYD TRACH DNA AMP PROBE: CPT

## 2021-05-12 PROCEDURE — 81002 URINALYSIS NONAUTO W/O SCOPE: CPT | Performed by: NURSE PRACTITIONER

## 2021-05-12 RX ORDER — PHENAZOPYRIDINE HYDROCHLORIDE 200 MG/1
200 TABLET, FILM COATED ORAL 3 TIMES DAILY
Qty: 6 TABLET | Refills: 0 | Status: SHIPPED | OUTPATIENT
Start: 2021-05-12 | End: 2021-05-14

## 2021-05-12 RX ORDER — NITROFURANTOIN 25; 75 MG/1; MG/1
100 CAPSULE ORAL EVERY 12 HOURS
Qty: 10 CAPSULE | Refills: 0 | Status: SHIPPED | OUTPATIENT
Start: 2021-05-12 | End: 2021-05-17

## 2021-05-12 ASSESSMENT — ENCOUNTER SYMPTOMS
DIARRHEA: 0
VOMITING: 0
NAUSEA: 0
ABDOMINAL PAIN: 0
FLANK PAIN: 0
CHILLS: 0

## 2021-05-12 NOTE — PROGRESS NOTES
Subjective:     Veronica Reyes is a 26 y.o. female who presents for Dysuria (x 2 days, frequency, painful)      Dysuria   This is a new problem. The current episode started in the past 7 days (2 days ago Veronica developed urinary frequency and painful urination. ). The problem occurs every urination. The problem has been gradually worsening. The pain is at a severity of 5/10. There has been no fever. Associated symptoms include frequency and urgency. Pertinent negatives include no chills, flank pain, hematuria, nausea or vomiting. She has tried nothing for the symptoms.   In addition, she would also like to be tested for gonorrhea and chlamydia.  She denies any symptoms related to sexually transmitted infections but would like screening as a precaution.      Review of Systems   Constitutional: Negative for chills.   Gastrointestinal: Negative for abdominal pain, diarrhea, nausea and vomiting.   Genitourinary: Positive for dysuria, frequency and urgency. Negative for flank pain and hematuria.       PMH:   Past Medical History:   Diagnosis Date   • Acne    • Chlamydia infection 8/15/11   • Dyslipidemia 4/29/2019   • Hepatitis 2017    Developed after Bactrim and imporved on fluids.   • Herpetic karin 2016    R 3rd digit for last 3 years.-many reoccurances a year   • Migraine    • Thyroid disease    • Urinary tract infection      ALLERGIES:   Allergies   Allergen Reactions   • Bactrim Vomiting     hepatitis   • Imitrex [Sumatriptan Succinate] Shortness of Breath   • Morphine Sulfate Itching   • Tdap [Dipth, Acell Pertus, Tetanus] Unspecified     seizure     SURGHX: History reviewed. No pertinent surgical history.  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   • Sexual activity: Yes     Partners: Male     Birth control/protection: Condom     Comment: Lives with her boyfriend. Boyfriend son lives week on and week off. Planning pregnancy. Works full time . Loves her job. No social or domestic concerns and has supportive partner.   Other Topics Concern   • Not on file   Social History Narrative   • Not on file     Social Determinants of Health     Financial Resource Strain:    • Difficulty of Paying Living Expenses:    Food Insecurity:    • Worried About Running Out of Food in the Last Year:    • Ran Out of Food in the Last Year:    Transportation Needs:    • Lack of Transportation (Medical):    • Lack of Transportation (Non-Medical):    Physical Activity:    • Days of Exercise per Week:    • Minutes of Exercise per Session:    Stress:    • Feeling of Stress :    Social Connections:    • Frequency of Communication with Friends and Family:    • Frequency of Social Gatherings with Friends and Family:    • Attends Tenri[employer] Services:    • Active Member of Clubs or Organizations:    • Attends Club or Organization Meetings:    • Marital Status:    Intimate Partner Violence:    • Fear of Current or Ex-Partner:    • Emotionally Abused:    • Physically Abused:    • Sexually Abused:      FH:   Family History   Problem Relation Age of Onset   • Genetic Disorder Father         epilepsy/migraines   • Other Father         kidney stones   • Cancer Father         skin cancer   • Cancer Maternal Grandfather         skin cancer   • Cancer Paternal Grandfather         colon   • Heart Disease Paternal Grandfather         CAD   • Hypertension Paternal Grandfather    • Diabetes Neg Hx    • Stroke Neg Hx          Objective:    /74 (BP Location: Left arm, Patient Position: Sitting, BP Cuff Size: Adult)   Pulse 80   Temp 36.8 °C (98.2 °F) (Temporal)   Resp 16   Wt 72.6 kg (160 lb)   LMP 05/12/2021   SpO2 99%   Breastfeeding Unknown   BMI 26.63 kg/m²     Physical Exam  Vitals and nursing note reviewed.   Constitutional:       General: She is not in acute distress.     Appearance: Normal appearance. She is not ill-appearing.   HENT:      Head: Normocephalic and atraumatic.      Right Ear: External ear normal.      Left Ear: External ear normal.      Nose: No congestion or rhinorrhea.      Mouth/Throat:      Mouth: Mucous membranes are moist.   Eyes:      Extraocular Movements: Extraocular movements intact.      Pupils: Pupils are equal, round, and reactive to light.   Cardiovascular:      Rate and Rhythm: Normal rate and regular rhythm.      Pulses: Normal pulses.      Heart sounds: Normal heart sounds.   Pulmonary:      Effort: Pulmonary effort is normal.      Breath sounds: Normal breath sounds.   Abdominal:      General: Abdomen is flat. Bowel sounds are normal.      Palpations: Abdomen is soft.      Tenderness: There is no abdominal tenderness. There is no right CVA tenderness or left CVA tenderness.   Musculoskeletal:         General: Normal range of motion.      Cervical back: Normal range of motion and neck supple.   Skin:     General: Skin is warm and dry.      Capillary Refill: Capillary refill takes less than 2 seconds.   Neurological:      General: No focal deficit present.      Mental Status: She is alert and oriented to person, place, and time. Mental status is at baseline.   Psychiatric:         Mood and Affect: Mood normal.         Behavior: Behavior normal.         Thought Content: Thought content normal.         Judgment: Judgment normal.       POCT urine: Moderate blood, positive moderate leukocytes  Assessment/Plan:   Assessment    1. Acute cystitis with hematuria  Urine Culture    nitrofurantoin (MACROBID)  100 MG Cap   2. Dysuria  POCT Urinalysis    phenazopyridine (PYRIDIUM) 200 MG Tab   3. Urinary frequency  POCT Urinalysis    phenazopyridine (PYRIDIUM) 200 MG Tab   4. Urinary urgency  POCT Urinalysis    phenazopyridine (PYRIDIUM) 200 MG Tab   5. Screen for sexually transmitted diseases  CHLAMYDIA/GC PCR URINE OR SWAB     Urine sent for culture and I will call her with any changes that need to be made to her antibiotic.  She was tested for gonorrhea and chlamydia today. Prescription was sent in to preferred pharmacy. AVS was given and reviewed with patient. Patient educated on red flags of UTI and encouraged to seek care back in UC or ER for  fever, chills, flank pain, or worsening symptoms.     AVS handout given and reviewed with patient. Pt educated on red flags and when to seek treatment back in ER or UC.

## 2021-05-13 LAB
C TRACH DNA SPEC QL NAA+PROBE: NEGATIVE
N GONORRHOEA DNA SPEC QL NAA+PROBE: NEGATIVE
SPECIMEN SOURCE: NORMAL

## 2021-07-23 ENCOUNTER — OFFICE VISIT (OUTPATIENT)
Dept: URGENT CARE | Facility: PHYSICIAN GROUP | Age: 26
End: 2021-07-23
Payer: COMMERCIAL

## 2021-07-23 ENCOUNTER — HOSPITAL ENCOUNTER (OUTPATIENT)
Facility: MEDICAL CENTER | Age: 26
End: 2021-07-23
Attending: NURSE PRACTITIONER
Payer: COMMERCIAL

## 2021-07-23 VITALS
RESPIRATION RATE: 16 BRPM | BODY MASS INDEX: 26.66 KG/M2 | HEART RATE: 92 BPM | TEMPERATURE: 99 F | OXYGEN SATURATION: 96 % | HEIGHT: 65 IN | WEIGHT: 160 LBS | DIASTOLIC BLOOD PRESSURE: 60 MMHG | SYSTOLIC BLOOD PRESSURE: 110 MMHG

## 2021-07-23 DIAGNOSIS — N30.01 ACUTE CYSTITIS WITH HEMATURIA: ICD-10-CM

## 2021-07-23 DIAGNOSIS — R30.0 DYSURIA: ICD-10-CM

## 2021-07-23 LAB
APPEARANCE UR: NORMAL
BILIRUB UR STRIP-MCNC: NEGATIVE MG/DL
COLOR UR AUTO: YELLOW
GLUCOSE UR STRIP.AUTO-MCNC: NEGATIVE MG/DL
INT CON NEG: NORMAL
INT CON POS: NORMAL
KETONES UR STRIP.AUTO-MCNC: NEGATIVE MG/DL
LEUKOCYTE ESTERASE UR QL STRIP.AUTO: NORMAL
NITRITE UR QL STRIP.AUTO: NEGATIVE
PH UR STRIP.AUTO: 6 [PH] (ref 5–8)
POC URINE PREGNANCY TEST: NORMAL
PROT UR QL STRIP: 100 MG/DL
RBC UR QL AUTO: NORMAL
SP GR UR STRIP.AUTO: 1.03
UROBILINOGEN UR STRIP-MCNC: 0.2 MG/DL

## 2021-07-23 PROCEDURE — 81002 URINALYSIS NONAUTO W/O SCOPE: CPT | Performed by: NURSE PRACTITIONER

## 2021-07-23 PROCEDURE — 87086 URINE CULTURE/COLONY COUNT: CPT

## 2021-07-23 PROCEDURE — 81025 URINE PREGNANCY TEST: CPT | Performed by: NURSE PRACTITIONER

## 2021-07-23 PROCEDURE — 99213 OFFICE O/P EST LOW 20 MIN: CPT | Performed by: NURSE PRACTITIONER

## 2021-07-23 RX ORDER — PHENAZOPYRIDINE HYDROCHLORIDE 200 MG/1
200 TABLET, FILM COATED ORAL 3 TIMES DAILY
Qty: 6 TABLET | Refills: 0 | Status: SHIPPED | OUTPATIENT
Start: 2021-07-23 | End: 2021-07-25

## 2021-07-23 RX ORDER — CEFDINIR 300 MG/1
300 CAPSULE ORAL EVERY 12 HOURS
Qty: 10 CAPSULE | Refills: 0 | Status: CANCELLED | OUTPATIENT
Start: 2021-07-23 | End: 2021-07-28

## 2021-07-23 RX ORDER — COVID-19 MOLECULAR TEST ASSAY
KIT MISCELLANEOUS
COMMUNITY
Start: 2021-07-16 | End: 2021-11-04

## 2021-07-23 RX ORDER — NITROFURANTOIN 25; 75 MG/1; MG/1
100 CAPSULE ORAL EVERY 12 HOURS
Qty: 10 CAPSULE | Refills: 0 | Status: SHIPPED | OUTPATIENT
Start: 2021-07-23 | End: 2021-07-28

## 2021-07-23 ASSESSMENT — ENCOUNTER SYMPTOMS
HEADACHES: 0
FEVER: 0
NAUSEA: 0
MYALGIAS: 0
DIZZINESS: 0
FLANK PAIN: 0
DIARRHEA: 0
BACK PAIN: 0
CONSTIPATION: 0
ABDOMINAL PAIN: 0
WEAKNESS: 0
CHILLS: 0
VOMITING: 0

## 2021-07-23 NOTE — PROGRESS NOTES
Subjective:      Veronica Reyes is a 26 y.o. female who presents with UTI (started yesturday , painful when urinating, cloudy urine)            HPI  States acute dysuria, urgency, frequency and cloudy urine that started today. Has had some diarrhea recently. Denies fever, nausea/vomiting or low pelvic pressure, no back pain. LMP 3 weeks ago.    PMH:  has a past medical history of Acne, Chlamydia infection (8/15/11), Dyslipidemia (4/29/2019), Hepatitis (2017), Herpetic karin (2016), Migraine, Thyroid disease, and Urinary tract infection. She also has no past medical history of Anxiety, Arrhythmia, ASTHMA, Blood transfusion without reported diagnosis, Cancer (HCC), CHF (congestive heart failure) (HCC), Clotting disorder (HCC), COPD (chronic obstructive pulmonary disease) (HCC), Depression, Diabetes, Heart attack (HCC), Heart murmur, Hypertension, IBD (inflammatory bowel disease), Kidney disease, Seizure (HCC), Stroke (HCC), or Substance abuse (HCC).  MEDS:   Current Outpatient Medications:   •  phenazopyridine (PYRIDIUM) 200 MG Tab, Take 1 tablet by mouth 3 times a day for 2 days., Disp: 6 tablet, Rfl: 0  •  nitrofurantoin (MACROBID) 100 MG Cap, Take 1 capsule by mouth every 12 hours for 5 days., Disp: 10 capsule, Rfl: 0  •  ID NOW COVID-19 Kit, , Disp: , Rfl:   •  acyclovir (ZOVIRAX) 400 MG tablet, TAKE 1 TABLET BY MOUTH THREE TIMES A DAY (Patient not taking: Reported on 7/23/2021), Disp: 15 Tab, Rfl: 0  ALLERGIES:   Allergies   Allergen Reactions   • Bactrim Vomiting     hepatitis   • Imitrex [Sumatriptan Succinate] Shortness of Breath   • Morphine Sulfate Itching   • Tdap [Dipth, Acell Pertus, Tetanus] Unspecified     seizure     SURGHX: History reviewed. No pertinent surgical history.  SOCHX:  reports that she has never smoked. She has never used smokeless tobacco. She reports current alcohol use. She reports current drug use. Drug: Marijuana.  FH: Family history was reviewed, no pertinent findings to  "report    Review of Systems   Constitutional: Negative for chills, fever and malaise/fatigue.   Gastrointestinal: Negative for abdominal pain, constipation, diarrhea, nausea and vomiting.   Genitourinary: Positive for dysuria, frequency and urgency. Negative for flank pain and hematuria.   Musculoskeletal: Negative for back pain and myalgias.   Skin: Negative for itching and rash.   Neurological: Negative for dizziness, weakness and headaches.   All other systems reviewed and are negative.         Objective:     /60 (BP Location: Right arm, Patient Position: Sitting, BP Cuff Size: Adult)   Pulse 92   Temp 37.2 °C (99 °F) (Temporal)   Resp 16   Ht 1.651 m (5' 5\")   Wt 72.6 kg (160 lb)   SpO2 96%   BMI 26.63 kg/m²      Physical Exam  Vitals reviewed.   Constitutional:       General: She is awake. She is not in acute distress.     Appearance: Normal appearance. She is well-developed. She is not ill-appearing, toxic-appearing or diaphoretic.   HENT:      Head: Normocephalic.   Eyes:      Conjunctiva/sclera: Conjunctivae normal.      Pupils: Pupils are equal, round, and reactive to light.   Cardiovascular:      Rate and Rhythm: Normal rate.   Pulmonary:      Effort: Pulmonary effort is normal.   Abdominal:      General: Bowel sounds are normal.      Palpations: Abdomen is soft.      Tenderness: There is no abdominal tenderness. There is no guarding or rebound.   Musculoskeletal:         General: Normal range of motion.      Cervical back: Normal range of motion and neck supple.   Skin:     General: Skin is warm and dry.   Neurological:      Mental Status: She is alert and oriented to person, place, and time.   Psychiatric:         Behavior: Behavior normal. Behavior is cooperative.                        Assessment/Plan:        1. Dysuria    - POCT Urinalysis  - POCT PREGNANCY  - phenazopyridine (PYRIDIUM) 200 MG Tab; Take 1 tablet by mouth 3 times a day for 2 days.  Dispense: 6 tablet; Refill: 0    2. Acute " cystitis with hematuria    - nitrofurantoin (MACROBID) 100 MG Cap; Take 1 capsule by mouth every 12 hours for 5 days.  Dispense: 10 capsule; Refill: 0  - Urine Culture; Future    -Increase water intake  -Urinate more frequently and empty bladder completely  -Practice good toileting hygiene after bowel movements and sexual intercourse, refrain from sexual intercourse until infection cleared  -Monitor for back/flank pain, difficulty urinating, blood in urine- need re-evaluation    MyChart release for urine culture result, may take up to 72 hrs for this result, patient notified of this and agree to plan of care

## 2021-07-26 LAB
BACTERIA UR CULT: NORMAL
SIGNIFICANT IND 70042: NORMAL
SITE SITE: NORMAL
SOURCE SOURCE: NORMAL

## 2021-11-04 ENCOUNTER — HOSPITAL ENCOUNTER (OUTPATIENT)
Facility: MEDICAL CENTER | Age: 26
End: 2021-11-04
Attending: INTERNAL MEDICINE
Payer: COMMERCIAL

## 2021-11-04 ENCOUNTER — OFFICE VISIT (OUTPATIENT)
Dept: URGENT CARE | Facility: PHYSICIAN GROUP | Age: 26
End: 2021-11-04
Payer: COMMERCIAL

## 2021-11-04 VITALS
HEIGHT: 65 IN | DIASTOLIC BLOOD PRESSURE: 76 MMHG | WEIGHT: 160 LBS | TEMPERATURE: 97.9 F | RESPIRATION RATE: 12 BRPM | SYSTOLIC BLOOD PRESSURE: 120 MMHG | HEART RATE: 77 BPM | BODY MASS INDEX: 26.66 KG/M2 | OXYGEN SATURATION: 97 %

## 2021-11-04 DIAGNOSIS — N30.00 ACUTE CYSTITIS WITHOUT HEMATURIA: ICD-10-CM

## 2021-11-04 LAB
APPEARANCE UR: CLEAR
BILIRUB UR STRIP-MCNC: NORMAL MG/DL
COLOR UR AUTO: NORMAL
GLUCOSE UR STRIP.AUTO-MCNC: 250 MG/DL
INT CON NEG: NORMAL
INT CON POS: NORMAL
KETONES UR STRIP.AUTO-MCNC: 15 MG/DL
LEUKOCYTE ESTERASE UR QL STRIP.AUTO: NORMAL
NITRITE UR QL STRIP.AUTO: POSITIVE
PH UR STRIP.AUTO: 5 [PH] (ref 5–8)
POC URINE PREGNANCY TEST: NEGATIVE
PROT UR QL STRIP: 100 MG/DL
RBC UR QL AUTO: NEGATIVE
SP GR UR STRIP.AUTO: 1.01
UROBILINOGEN UR STRIP-MCNC: 8 MG/DL

## 2021-11-04 PROCEDURE — 99214 OFFICE O/P EST MOD 30 MIN: CPT | Performed by: PHYSICIAN ASSISTANT

## 2021-11-04 PROCEDURE — 87077 CULTURE AEROBIC IDENTIFY: CPT

## 2021-11-04 PROCEDURE — 81002 URINALYSIS NONAUTO W/O SCOPE: CPT | Performed by: PHYSICIAN ASSISTANT

## 2021-11-04 PROCEDURE — 87186 SC STD MICRODIL/AGAR DIL: CPT

## 2021-11-04 PROCEDURE — 81025 URINE PREGNANCY TEST: CPT | Performed by: PHYSICIAN ASSISTANT

## 2021-11-04 PROCEDURE — 87086 URINE CULTURE/COLONY COUNT: CPT

## 2021-11-04 RX ORDER — NITROFURANTOIN 25; 75 MG/1; MG/1
100 CAPSULE ORAL EVERY 12 HOURS
Qty: 10 CAPSULE | Refills: 0 | Status: SHIPPED | OUTPATIENT
Start: 2021-11-04 | End: 2021-11-09

## 2021-11-04 RX ORDER — METRONIDAZOLE 500 MG/1
TABLET ORAL
COMMUNITY
Start: 2021-08-09 | End: 2021-11-04

## 2021-11-04 RX ORDER — PHENAZOPYRIDINE HYDROCHLORIDE 200 MG/1
200 TABLET, FILM COATED ORAL 3 TIMES DAILY
Qty: 6 TABLET | Refills: 0 | Status: SHIPPED | OUTPATIENT
Start: 2021-11-04 | End: 2021-11-06

## 2021-11-04 ASSESSMENT — ENCOUNTER SYMPTOMS
FEVER: 0
CHILLS: 0
VOMITING: 0
FLANK PAIN: 0
NAUSEA: 0

## 2021-11-04 NOTE — PROGRESS NOTES
"Subjective:     Veronica Reyes  is a 26 y.o. female who presents for Dysuria (started yesturday painful urination , frequent urination)       Dysuria   Associated symptoms include frequency and urgency. Pertinent negatives include no chills, flank pain, nausea or vomiting.   Ms. Reyes is a very pleasant 26-year-old female with history of recurrent urinary tract infections who presents to urgent care with onset yesterday of dysuria, urgency and frequency with urination as well as incomplete bladder emptying sensation.  Patient denies gross hematuria.  Denies fever or chills, flank pain, nausea or vomiting.      Review of Systems   Constitutional: Negative for chills, fever and malaise/fatigue.   Gastrointestinal: Negative for nausea and vomiting.   Genitourinary: Positive for dysuria, frequency and urgency. Negative for flank pain.   All other systems reviewed and are negative.    Allergies   Allergen Reactions   • Bactrim Vomiting     hepatitis   • Imitrex [Sumatriptan Succinate] Shortness of Breath   • Morphine Sulfate Itching   • Tdap [Dipth, Acell Pertus, Tetanus] Unspecified     seizure     Past medical history, family history, surgical history and social history are reviewed and updated in the record today.     Objective:   /76 (BP Location: Right arm, Patient Position: Sitting, BP Cuff Size: Adult)   Pulse 77   Temp 36.6 °C (97.9 °F) (Temporal)   Resp 12   Ht 1.651 m (5' 5\")   Wt 72.6 kg (160 lb)   SpO2 97%   BMI 26.63 kg/m²   Physical Exam  Vitals and nursing note reviewed.   Constitutional:       Appearance: She is well-developed.   HENT:      Head: Normocephalic and atraumatic.      Right Ear: External ear normal.      Left Ear: External ear normal.      Nose: Nose normal.      Mouth/Throat:      Mouth: Mucous membranes are moist.   Eyes:      Extraocular Movements: Extraocular movements intact.      Conjunctiva/sclera: Conjunctivae normal.      Pupils: Pupils are equal, round, and reactive to " light.   Cardiovascular:      Rate and Rhythm: Normal rate and regular rhythm.      Heart sounds: Normal heart sounds.   Pulmonary:      Effort: Pulmonary effort is normal.      Breath sounds: Normal breath sounds.   Abdominal:      General: Abdomen is flat. Bowel sounds are normal.      Palpations: Abdomen is soft.      Tenderness: There is abdominal tenderness in the suprapubic area. There is no right CVA tenderness or left CVA tenderness.   Musculoskeletal:         General: Normal range of motion.      Cervical back: Normal range of motion and neck supple.   Lymphadenopathy:      Cervical: No cervical adenopathy.   Skin:     General: Skin is warm and dry.      Findings: No rash.   Neurological:      Mental Status: She is alert and oriented to person, place, and time.      Cranial Nerves: Cranial nerves are intact.      Sensory: Sensation is intact.      Motor: Motor function is intact.      Coordination: Coordination is intact.   Psychiatric:         Attention and Perception: Attention normal.         Mood and Affect: Mood normal.         Speech: Speech normal.         Behavior: Behavior normal.         Thought Content: Thought content normal.         Judgment: Judgment normal.          Assessment/Plan:   1. Acute cystitis without hematuria  - POCT Urinalysis  - POCT PREGNANCY  - Urine Culture; Future  - nitrofurantoin (MACROBID) 100 MG Cap; Take 1 Capsule by mouth every 12 hours for 5 days.  Dispense: 10 Capsule; Refill: 0  - phenazopyridine (PYRIDIUM) 200 MG Tab; Take 1 Tablet by mouth 3 times a day for 2 days.  Dispense: 6 Tablet; Refill: 0      Urinalysis is suggestive of urinary tract infection.  Previous urine culture from date of service 7/26/2 1 - for UTI and 5/12/2 1+ for UTI with E. coli sensitive to nitrofurantoin reviewed by myself as part of today's visit.  Patient be treated with nitrofurantoin pending culture and sensitivity.  She is also given Pyridium.  Increase fluids, rest.    Differential  diagnosis, natural history, supportive care, and indications for immediate follow-up discussed.  Red flag warning symptoms and strict ER/follow-up precautions given.  The patient demonstrated a good understanding and agreed with the treatment plan.  Upon entering exam room I ensured patient was wearing a mask.  This provider wore appropriate PPE throughout entire visit.  Patient wore mask entire visit except for a brief period while examining oropharynx.    Please note that this note was created using voice recognition speech to text software. Every effort has been made to correct obvious errors.  However, I expect there are errors of grammar and possibly context that were not discovered prior to finalizing the note  ISADORA Bob PA-C

## 2021-11-05 DIAGNOSIS — N30.00 ACUTE CYSTITIS WITHOUT HEMATURIA: ICD-10-CM

## 2021-12-13 ENCOUNTER — HOSPITAL ENCOUNTER (OUTPATIENT)
Facility: MEDICAL CENTER | Age: 26
End: 2021-12-13
Attending: PHYSICIAN ASSISTANT
Payer: COMMERCIAL

## 2021-12-13 ENCOUNTER — OFFICE VISIT (OUTPATIENT)
Dept: URGENT CARE | Facility: CLINIC | Age: 26
End: 2021-12-13
Payer: COMMERCIAL

## 2021-12-13 VITALS
HEART RATE: 72 BPM | TEMPERATURE: 97.7 F | HEIGHT: 65 IN | OXYGEN SATURATION: 95 % | RESPIRATION RATE: 14 BRPM | WEIGHT: 160.2 LBS | BODY MASS INDEX: 26.69 KG/M2 | SYSTOLIC BLOOD PRESSURE: 116 MMHG | DIASTOLIC BLOOD PRESSURE: 78 MMHG

## 2021-12-13 DIAGNOSIS — N12 PYELONEPHRITIS: ICD-10-CM

## 2021-12-13 DIAGNOSIS — R30.0 DYSURIA: ICD-10-CM

## 2021-12-13 DIAGNOSIS — N39.0 RECURRENT UTI: ICD-10-CM

## 2021-12-13 LAB
APPEARANCE UR: NORMAL
BILIRUB UR STRIP-MCNC: NORMAL MG/DL
COLOR UR AUTO: NORMAL
GLUCOSE UR STRIP.AUTO-MCNC: 250 MG/DL
INT CON NEG: NORMAL
INT CON POS: NORMAL
KETONES UR STRIP.AUTO-MCNC: 15 MG/DL
LEUKOCYTE ESTERASE UR QL STRIP.AUTO: NORMAL
NITRITE UR QL STRIP.AUTO: POSITIVE
PH UR STRIP.AUTO: 5 [PH] (ref 5–8)
POC URINE PREGNANCY TEST: NEGATIVE
PROT UR QL STRIP: >=300 MG/DL
RBC UR QL AUTO: NEGATIVE
SP GR UR STRIP.AUTO: <=1.005
UROBILINOGEN UR STRIP-MCNC: >=8 MG/DL

## 2021-12-13 PROCEDURE — 87186 SC STD MICRODIL/AGAR DIL: CPT

## 2021-12-13 PROCEDURE — 81002 URINALYSIS NONAUTO W/O SCOPE: CPT | Performed by: PHYSICIAN ASSISTANT

## 2021-12-13 PROCEDURE — 81025 URINE PREGNANCY TEST: CPT | Performed by: PHYSICIAN ASSISTANT

## 2021-12-13 PROCEDURE — 99215 OFFICE O/P EST HI 40 MIN: CPT | Performed by: PHYSICIAN ASSISTANT

## 2021-12-13 PROCEDURE — 87077 CULTURE AEROBIC IDENTIFY: CPT | Mod: 91

## 2021-12-13 PROCEDURE — 87086 URINE CULTURE/COLONY COUNT: CPT

## 2021-12-13 RX ORDER — CEFTRIAXONE 1 G/1
1 INJECTION, POWDER, FOR SOLUTION INTRAMUSCULAR; INTRAVENOUS ONCE
Status: COMPLETED | OUTPATIENT
Start: 2021-12-13 | End: 2021-12-13

## 2021-12-13 RX ORDER — CIPROFLOXACIN 500 MG/1
500 TABLET, FILM COATED ORAL EVERY 12 HOURS
Qty: 14 TABLET | Refills: 0 | Status: SHIPPED | OUTPATIENT
Start: 2021-12-13 | End: 2021-12-20

## 2021-12-13 RX ORDER — ACYCLOVIR 400 MG/1
400 TABLET ORAL 3 TIMES DAILY
COMMUNITY
Start: 2021-12-02

## 2021-12-13 RX ADMIN — Medication 3.6 ML: at 09:58

## 2021-12-13 RX ADMIN — CEFTRIAXONE 1 G: 1 INJECTION, POWDER, FOR SOLUTION INTRAMUSCULAR; INTRAVENOUS at 09:50

## 2021-12-13 ASSESSMENT — ENCOUNTER SYMPTOMS
CHILLS: 0
SWEATS: 0
NAUSEA: 0
VOMITING: 0
FLANK PAIN: 1

## 2021-12-13 NOTE — PROGRESS NOTES
"Subjective:   Veronica Reyes is a 26 y.o. female who presents for UTI (shwetha flank pain, frequent urination,painful urination x 48hrs; took azo )        Dysuria   This is a new problem. The current episode started yesterday. The problem occurs every urination. The problem has been rapidly worsening. The quality of the pain is described as burning. The pain is moderate. The maximum temperature recorded prior to her arrival was 101 - 101.9 F (101F). The fever has been present for less than 1 day. She is sexually active. There is no history of pyelonephritis. Associated symptoms include flank pain (mild, right), frequency and urgency. Pertinent negatives include no chills, discharge, hematuria, hesitancy, nausea, sweats or vomiting. She has tried increased fluids and acetaminophen (azo, took Tylenol this morning.) for the symptoms. The treatment provided no relief. Her past medical history is significant for recurrent UTIs (\"every month\"). There is no history of kidney stones or a urological procedure.     Review of Systems   Constitutional: Negative for chills.   Gastrointestinal: Negative for nausea and vomiting.   Genitourinary: Positive for dysuria, flank pain (mild, right), frequency and urgency. Negative for hematuria and hesitancy.       PMH:  has a past medical history of Acne, Chlamydia infection (8/15/11), Dyslipidemia (4/29/2019), Hepatitis (2017), Herpetic karin (2016), Migraine, Thyroid disease, and Urinary tract infection. She also has no past medical history of Anxiety, Arrhythmia, ASTHMA, Blood transfusion without reported diagnosis, Cancer (HCC), CHF (congestive heart failure) (HCC), Clotting disorder (HCC), COPD (chronic obstructive pulmonary disease) (HCC), Depression, Diabetes, Heart attack (HCC), Heart murmur, Hypertension, IBD (inflammatory bowel disease), Kidney disease, Seizure (HCC), Stroke (HCC), or Substance abuse ([facility]).  MEDS:   Current Outpatient Medications:   •  acyclovir (ZOVIRAX) 400 MG " "tablet, Take 400 mg by mouth 3 times a day., Disp: , Rfl:   •  ciprofloxacin (CIPRO) 500 MG Tab, Take 1 Tablet by mouth every 12 hours for 7 days., Disp: 14 Tablet, Rfl: 0  ALLERGIES:   Allergies   Allergen Reactions   • Bactrim Vomiting     hepatitis   • Imitrex [Sumatriptan Succinate] Shortness of Breath   • Morphine Sulfate Itching   • Tdap [Dipth, Acell Pertus, Tetanus] Unspecified     seizure     SURGHX: History reviewed. No pertinent surgical history.  SOCHX:  reports that she has never smoked. She has never used smokeless tobacco. She reports current alcohol use. She reports current drug use. Drug: Marijuana.  FH: Family history was reviewed, no pertinent findings to report   Objective:   /78 (BP Location: Right arm, Patient Position: Sitting)   Pulse 72   Temp 36.5 °C (97.7 °F) (Temporal)   Resp 14   Ht 1.651 m (5' 5\")   Wt 72.7 kg (160 lb 3.2 oz)   SpO2 95%   BMI 26.66 kg/m²   Physical Exam  Vitals reviewed.   Constitutional:       General: She is not in acute distress.     Appearance: Normal appearance. She is well-developed. She is not toxic-appearing.   HENT:      Head: Normocephalic and atraumatic.      Right Ear: External ear normal.      Left Ear: External ear normal.      Nose: Nose normal.   Cardiovascular:      Rate and Rhythm: Normal rate and regular rhythm.   Pulmonary:      Effort: Pulmonary effort is normal. No respiratory distress.      Breath sounds: No stridor.   Abdominal:      General: Abdomen is flat.      Palpations: Abdomen is soft.      Tenderness: There is abdominal tenderness (mild) in the suprapubic area. There is right CVA tenderness (mild). There is no left CVA tenderness, guarding or rebound. Negative signs include McBurney's sign.   Skin:     General: Skin is dry.   Neurological:      Comments: Alert and oriented. CN2-12 grossly intact   Psychiatric:         Speech: Speech normal.         Behavior: Behavior normal.           Assessment/Plan:   1. Pyelonephritis  - " ciprofloxacin (CIPRO) 500 MG Tab; Take 1 Tablet by mouth every 12 hours for 7 days.  Dispense: 14 Tablet; Refill: 0  - Referral back to [facility] PCP  - cefTRIAXone (Rocephin) injection 1 g  - Referral to Urology  - lidocaine (XYLOCAINE) 1 % injection 3.6 mL    2. Dysuria  - POCT Urinalysis  - POCT PREGNANCY  - Urine Culture; Future    3. Recurrent UTI  - Referral to Urology    Other orders  - acyclovir (ZOVIRAX) 400 MG tablet; Take 400 mg by mouth 3 times a day.       Patient records reviewed.  She was evaluated in clinic on 11/5/2021 for similar symptoms.  Her urine culture was positive for E. coli with good broad-spectrum sensitivity.  It looks like this is her fourth UTI in the last 6 months.    UA results skewed due to use of Azo.  Patient's pregnancy test is negative.  Based on patient's history and exam today her symptoms are consistent with early pyelonephritis.  She is currently well-appearing and her vital signs are stable, which is reassuring.  She does have Tylenol on board, however.    Patient does not tolerate Bactrim.  Rocephin 1 g IM administered in clinic today.  Additionally we will start patient on Cipro.  Medication risks and black box warning discussed with patient.  I feel that benefits outweigh risks at this time.  Patient verbalized good understanding of risks and consents to treatment with this antibiotic.    Continue to push fluids.  Given recurrence of urinary tract infections we will refer patient to urology for further evaluation and management. STRICT ED precautions- patient was given an educational handout detailing red flag signs and symptoms and ED precautions.  Recommend PCP follow-up in the next 24 to 48 hours.  Return precautions also reviewed.  Urine sent for culture.  I will contact patient with results on MyChart and adjust treatment plan as indicated.    Continue cranberry pills.  We will also try patient on d-mannose following intercourse.    Differential diagnosis, natural  history, supportive care, and indications for immediate follow-up discussed.    My total time spent caring for the patient on the day of the encounter was 40 minutes.   This does not include time spent on separately billable procedures/tests.

## 2021-12-13 NOTE — PATIENT INSTRUCTIONS
1. F/U with PCP in next 24-48 hrs.  2. ANY worsening of sx or new symptoms--> to ED immediately!  3. F/U with urology due to recurrent UTI.  4. Try using D-mannose for 2-3 days following sexual intercourse or at the earliest onset of symptoms. Recommend 1000mg 3x/day. Take with large glass of water.    Pyelonephritis, Adult    Pyelonephritis is an infection that occurs in the kidney. The kidneys are organs that help clean the blood by moving waste out of the blood and into the pee (urine). This infection can happen quickly, or it can last for a long time. In most cases, it clears up with treatment and does not cause other problems.  What are the causes?  This condition may be caused by:  · Germs (bacteria) going from the bladder up to the kidney. This may happen after having a bladder infection.  · Germs going from the blood to the kidney.  What increases the risk?  This condition is more likely to develop in:  · Pregnant women.  · Older people.  · People who have any of these conditions:  ? Diabetes.  ? Inflammation of the prostate gland (prostatitis), in males.  ? Kidney stones or bladder stones.  ? Other problems with the kidney or the parts of your body that carry pee from the kidneys to the bladder (ureters).  ? Cancer.  · People who have a small, thin tube (catheter) placed in the bladder.  · People who are sexually active.  · Women who use a medicine that kills sperm (spermicide) to prevent pregnancy.  · People who have had a prior urinary tract infection (UTI).  What are the signs or symptoms?  Symptoms of this condition include:  · Peeing often.  · A strong urge to pee right away.  · Burning or stinging when peeing.  · Belly pain.  · Back pain.  · Pain in the side (flank area).  · Fever or chills.  · Blood in the pee, or dark pee.  · Feeling sick to your stomach (nauseous) or throwing up (vomiting).  How is this treated?  This condition may be treated by:  · Taking antibiotic medicines by mouth  (orally).  · Drinking enough fluids.  If the infection is bad, you may need to stay in the hospital. You may be given antibiotics and fluids that are put directly into a vein through an IV tube.  In some cases, other treatments may be needed.  Follow these instructions at home:  Medicines  · Take your antibiotic medicine as told by your doctor. Do not stop taking the antibiotic even if you start to feel better.  · Take over-the-counter and prescription medicines only as told by your doctor.  General instructions    · Drink enough fluid to keep your pee pale yellow.  · Avoid caffeine, tea, and carbonated drinks.  · Pee (urinate) often. Avoid holding in pee for long periods of time.  · Pee before and after sex.  · After pooping (having a bowel movement), women should wipe from front to back. Use each tissue only once.  · Keep all follow-up visits as told by your doctor. This is important.  Contact a doctor if:  · You do not feel better after 2 days.  · Your symptoms get worse.  · You have a fever.  Get help right away if:  · You cannot take your medicine or drink fluids as told.  · You have chills and shaking.  · You throw up.  · You have very bad pain in your side or back.  · You feel very weak or you pass out (faint).  Summary  · Pyelonephritis is an infection that occurs in the kidney.  · In most cases, this infection clears up with treatment and does not cause other problems.  · Take your antibiotic medicine as told by your doctor. Do not stop taking the antibiotic even if you start to feel better.  · Drink enough fluid to keep your pee pale yellow.  This information is not intended to replace advice given to you by your health care provider. Make sure you discuss any questions you have with your health care provider.  Document Released: 01/25/2006 Document Revised: 10/22/2019 Document Reviewed: 10/22/2019  [employer]vier Patient Education © 2020 [employer]

## 2021-12-14 DIAGNOSIS — R30.0 DYSURIA: ICD-10-CM

## 2021-12-16 LAB
BACTERIA UR CULT: ABNORMAL
SIGNIFICANT IND 70042: ABNORMAL
SITE SITE: ABNORMAL
SOURCE SOURCE: ABNORMAL

## 2021-12-17 DIAGNOSIS — R82.71 GROUP B STREPTOCOCCAL BACTERIURIA: ICD-10-CM

## 2021-12-17 RX ORDER — AMOXICILLIN 500 MG/1
500 CAPSULE ORAL 3 TIMES DAILY
Qty: 15 CAPSULE | Refills: 0 | Status: SHIPPED | OUTPATIENT
Start: 2021-12-17 | End: 2021-12-22

## 2022-03-31 ENCOUNTER — APPOINTMENT (OUTPATIENT)
Dept: URGENT CARE | Facility: CLINIC | Age: 27
End: 2022-03-31
Payer: COMMERCIAL

## 2022-03-31 ENCOUNTER — APPOINTMENT (OUTPATIENT)
Dept: URGENT CARE | Facility: PHYSICIAN GROUP | Age: 27
End: 2022-03-31
Payer: COMMERCIAL

## 2022-03-31 ENCOUNTER — OFFICE VISIT (OUTPATIENT)
Dept: URGENT CARE | Facility: CLINIC | Age: 27
End: 2022-03-31
Payer: COMMERCIAL

## 2022-03-31 VITALS
BODY MASS INDEX: 26.99 KG/M2 | RESPIRATION RATE: 14 BRPM | OXYGEN SATURATION: 96 % | DIASTOLIC BLOOD PRESSURE: 64 MMHG | HEART RATE: 75 BPM | SYSTOLIC BLOOD PRESSURE: 126 MMHG | HEIGHT: 65 IN | WEIGHT: 162 LBS | TEMPERATURE: 98.6 F

## 2022-03-31 DIAGNOSIS — J01.00 ACUTE NON-RECURRENT MAXILLARY SINUSITIS: ICD-10-CM

## 2022-03-31 PROCEDURE — 99213 OFFICE O/P EST LOW 20 MIN: CPT | Performed by: NURSE PRACTITIONER

## 2022-03-31 RX ORDER — AMOXICILLIN AND CLAVULANATE POTASSIUM 875; 125 MG/1; MG/1
1 TABLET, FILM COATED ORAL 2 TIMES DAILY
Qty: 14 TABLET | Refills: 0 | Status: SHIPPED | OUTPATIENT
Start: 2022-03-31 | End: 2022-04-07

## 2022-03-31 ASSESSMENT — ENCOUNTER SYMPTOMS
SINUS PRESSURE: 1
CONSTITUTIONAL NEGATIVE: 1
FEVER: 0

## 2022-03-31 ASSESSMENT — VISUAL ACUITY: OU: 1

## 2022-03-31 NOTE — PROGRESS NOTES
Subjective:     Veronica Reyes is a 27 y.o. female who presents for Sinus Problem (X weeks; congestion; headaches; pain in both ears; bright green mucous; runny nose)       Sinus Problem  This is a new problem. Episode onset: 4 weeks ago. The problem has been gradually worsening since onset. Associated symptoms include congestion (Green nasal discharge), ear pain and sinus pressure. Treatments tried: Saline wash. The treatment provided no relief.     Patient was screened prior to rooming and denied COVID-19 diagnosis or contact with a person who has been diagnosed or is suspected to have COVID-19. During this visit, appropriate PPE was worn, hand hygiene was performed, and the patient and any visitors were masked.     PMH:  has a past medical history of Acne, Chlamydia infection (8/15/11), Dyslipidemia (4/29/2019), Hepatitis (2017), Herpetic karin (2016), Migraine, Thyroid disease, and Urinary tract infection.    She has no past medical history of Anxiety, Arrhythmia, ASTHMA, Blood transfusion without reported diagnosis, Cancer (HCC), CHF (congestive heart failure) (HCC), Clotting disorder (HCC), COPD (chronic obstructive pulmonary disease) (HCC), Depression, Diabetes, Heart attack (HCC), Heart murmur, Hypertension, IBD (inflammatory bowel disease), Kidney disease, Seizure (HCC), Stroke (HCC), or Substance abuse (HCC).    MEDS:   Current Outpatient Medications:   •  amoxicillin-clavulanate (AUGMENTIN) 875-125 MG Tab, Take 1 Tablet by mouth 2 times a day for 7 days., Disp: 14 Tablet, Rfl: 0  •  acyclovir (ZOVIRAX) 400 MG tablet, Take 400 mg by mouth 3 times a day., Disp: , Rfl:     ALLERGIES:   Allergies   Allergen Reactions   • Bactrim Vomiting     hepatitis   • Imitrex [Sumatriptan Succinate] Shortness of Breath   • Morphine Sulfate Itching   • Tdap [Dipth, Acell Pertus, Tetanus] Unspecified     seizure     SURGHX: History reviewed. No pertinent surgical history.    SOCHX:  reports that she has never smoked. She has  "never used smokeless tobacco. She reports current alcohol use. She reports current drug use. Drug: Marijuana.     FH: Reviewed with patient, not pertinent to this visit.    Review of Systems   Constitutional: Negative.  Negative for fever and malaise/fatigue.   HENT: Positive for congestion (Green nasal discharge), ear pain and sinus pressure.    Endo/Heme/Allergies: Negative for environmental allergies.   All other systems reviewed and are negative.    Additional details per HPI.      Objective:     /64 (BP Location: Right arm, Patient Position: Sitting)   Pulse 75   Temp 37 °C (98.6 °F) (Temporal)   Resp 14   Ht 1.651 m (5' 5\")   Wt 73.5 kg (162 lb)   SpO2 96%   BMI 26.96 kg/m²     Physical Exam  Vitals reviewed.   Constitutional:       General: She is not in acute distress.     Appearance: She is well-developed. She is not ill-appearing or toxic-appearing.   HENT:      Right Ear: Tympanic membrane normal.      Left Ear: Tympanic membrane normal.      Nose: Congestion present.      Right Sinus: Maxillary sinus tenderness and frontal sinus tenderness present.      Left Sinus: Maxillary sinus tenderness and frontal sinus tenderness present.      Mouth/Throat:      Mouth: Mucous membranes are moist.      Pharynx: Oropharynx is clear.   Eyes:      General: Vision grossly intact.      Extraocular Movements: Extraocular movements intact.   Cardiovascular:      Rate and Rhythm: Normal rate.   Pulmonary:      Effort: Pulmonary effort is normal. No respiratory distress.   Musculoskeletal:         General: No deformity. Normal range of motion.      Cervical back: Normal range of motion.   Skin:     General: Skin is warm and dry.      Coloration: Skin is not pale.   Neurological:      Mental Status: She is alert and oriented to person, place, and time.      Sensory: No sensory deficit.      Motor: No weakness.   Psychiatric:         Behavior: Behavior normal. Behavior is cooperative.       Assessment/Plan: "     1. Acute non-recurrent maxillary sinusitis  - amoxicillin-clavulanate (AUGMENTIN) 875-125 MG Tab; Take 1 Tablet by mouth 2 times a day for 7 days.  Dispense: 14 Tablet; Refill: 0    Rx as above sent electronically.    Suggest using any combination of the following over-the-counter medications per 's instructions:  - sinus rinse kits, neti pot, nasal saline sprays  - nasal steroid sprays (e.g. fluticasone/Flonase, Nasacort)  - oral daily antihistamine (e.g. loratadine/Claritin, Zyrtec, Allegra)  - oral decongestant (e.g. pseudoephedrine, Sudafed)  - oral pain reliever/fever reducer (e.g. acetaminophen, Tylenol)  - oral anti-inflammatory/pain reliever/fever reducer (NSAID) (e.g. ibuprofen, Motrin, Advil)    Differential diagnosis, natural history, supportive care, over-the-counter symptom management per 's instructions, close monitoring, and indications for immediate follow-up discussed.     All questions answered. Patient agrees with the plan of care.    Discharge summary provided through [facility].

## 2022-03-31 NOTE — PATIENT INSTRUCTIONS
Sinusitis, Adult  Sinusitis is inflammation of your sinuses. Sinuses are hollow spaces in the bones around your face. Your sinuses are located:  · Around your eyes.  · In the middle of your forehead.  · Behind your nose.  · In your cheekbones.  Mucus normally drains out of your sinuses. When your nasal tissues become inflamed or swollen, mucus can become trapped or blocked. This allows bacteria, viruses, and fungi to grow, which leads to infection. Most infections of the sinuses are caused by a virus.  Sinusitis can develop quickly. It can last for up to 4 weeks (acute) or for more than 12 weeks (chronic). Sinusitis often develops after a cold.  What are the causes?  This condition is caused by anything that creates swelling in the sinuses or stops mucus from draining. This includes:  · Allergies.  · Asthma.  · Infection from bacteria or viruses.  · Deformities or blockages in your nose or sinuses.  · Abnormal growths in the nose (nasal polyps).  · Pollutants, such as chemicals or irritants in the air.  · Infection from fungi (rare).  What increases the risk?  You are more likely to develop this condition if you:  · Have a weak body defense system (immune system).  · Do a lot of swimming or diving.  · Overuse nasal sprays.  · Smoke.  What are the signs or symptoms?  The main symptoms of this condition are pain and a feeling of pressure around the affected sinuses. Other symptoms include:  · Stuffy nose or congestion.  · Thick drainage from your nose.  · Swelling and warmth over the affected sinuses.  · Headache.  · Upper toothache.  · A cough that may get worse at night.  · Extra mucus that collects in the throat or the back of the nose (postnasal drip).  · Decreased sense of smell and taste.  · Fatigue.  · A fever.  · Sore throat.  · Bad breath.  How is this diagnosed?  This condition is diagnosed based on:  · Your symptoms.  · Your medical history.  · A physical exam.  · Tests to find out if your condition is  acute or chronic. This may include:  ? Checking your nose for nasal polyps.  ? Viewing your sinuses using a device that has a light (endoscope).  ? Testing for allergies or bacteria.  ? Imaging tests, such as an MRI or CT scan.  In rare cases, a bone biopsy may be done to rule out more serious types of fungal sinus disease.  How is this treated?  Treatment for sinusitis depends on the cause and whether your condition is chronic or acute.  · If caused by a virus, your symptoms should go away on their own within 10 days. You may be given medicines to relieve symptoms. They include:  ? Medicines that shrink swollen nasal passages (topical intranasal decongestants).  ? Medicines that treat allergies (antihistamines).  ? A spray that eases inflammation of the nostrils (topical intranasal corticosteroids).  ? Rinses that help get rid of thick mucus in your nose (nasal saline washes).  · If caused by bacteria, your health care provider may recommend waiting to see if your symptoms improve. Most bacterial infections will get better without antibiotic medicine. You may be given antibiotics if you have:  ? A severe infection.  ? A weak immune system.  · If caused by narrow nasal passages or nasal polyps, you may need to have surgery.  Follow these instructions at home:  Medicines  · Take, use, or apply over-the-counter and prescription medicines only as told by your health care provider. These may include nasal sprays.  · If you were prescribed an antibiotic medicine, take it as told by your health care provider. Do not stop taking the antibiotic even if you start to feel better.  Hydrate and humidify    · Drink enough fluid to keep your urine pale yellow. Staying hydrated will help to thin your mucus.  · Use a cool mist humidifier to keep the humidity level in your home above 50%.  · Inhale steam for 10-15 minutes, 3-4 times a day, or as told by your health care provider. You can do this in the bathroom while a hot shower is  running.  · Limit your exposure to cool or dry air.  Rest  · Rest as much as possible.  · Sleep with your head raised (elevated).  · Make sure you get enough sleep each night.  General instructions    · Apply a warm, moist washcloth to your face 3-4 times a day or as told by your health care provider. This will help with discomfort.  · Wash your hands often with soap and water to reduce your exposure to germs. If soap and water are not available, use hand .  · Do not smoke. Avoid being around people who are smoking (secondhand smoke).  · Keep all follow-up visits as told by your health care provider. This is important.  Contact a health care provider if:  · You have a fever.  · Your symptoms get worse.  · Your symptoms do not improve within 10 days.  Get help right away if:  · You have a severe headache.  · You have persistent vomiting.  · You have severe pain or swelling around your face or eyes.  · You have vision problems.  · You develop confusion.  · Your neck is stiff.  · You have trouble breathing.  Summary  · Sinusitis is soreness and inflammation of your sinuses. Sinuses are hollow spaces in the bones around your face.  · This condition is caused by nasal tissues that become inflamed or swollen. The swelling traps or blocks the flow of mucus. This allows bacteria, viruses, and fungi to grow, which leads to infection.  · If you were prescribed an antibiotic medicine, take it as told by your health care provider. Do not stop taking the antibiotic even if you start to feel better.  · Keep all follow-up visits as told by your health care provider. This is important.  This information is not intended to replace advice given to you by your health care provider. Make sure you discuss any questions you have with your health care provider.  Document Released: 12/18/2006 Document Revised: 05/20/2019 Document Reviewed: 05/20/2019  Elsevier Patient Education © 2020 [employer]      Suggest using any  combination of the following over-the-counter medications per 's instructions:  - sinus rinse kits, neti pot, nasal saline sprays  - nasal steroid sprays (e.g. fluticasone/Flonase, Nasacort)  - oral daily antihistamine (e.g. loratadine/Claritin, Zyrtec, Allegra)  - oral decongestant (e.g. pseudoephedrine, Sudafed)  - oral pain reliever/fever reducer (e.g. acetaminophen, Tylenol)  - oral anti-inflammatory/pain reliever/fever reducer (NSAID) (e.g. ibuprofen, Motrin, Advil)

## 2022-04-26 NOTE — TELEPHONE ENCOUNTER
"Chief Complaint   Patient presents with     RECHECK     History of ADHD        /78 (BP Location: Right arm, Patient Position: Sitting, Cuff Size: Adult Regular)   Pulse 109   Ht 5' 8.86\" (174.9 cm)   Wt 196 lb 3.2 oz (89 kg)   BMI 29.09 kg/m      Jhon Lopez, EMT  April 26, 2022  " Was the patient seen in the last year in this department? Yes    Does patient have an active prescription for medications requested? No     Received Request Via: Pharmacy

## 2023-02-07 NOTE — ED TRIAGE NOTES
"Chief Complaint   Patient presents with   • Vaginal Bleeding     6 weeks, had bright red bleeding yesterday similar to period.    • Pregnancy     Pt reports cramping, very light bleeding today.  /74   Pulse 80   Temp 36.1 °C (96.9 °F) (Temporal)   Resp 18   Ht 1.651 m (5' 5\")   Wt 72.1 kg (158 lb 15.2 oz)   SpO2 97%   Pt informed of wait times. Educated on triage process.  Asked to return to triage RN for any new or worsening of symptoms. Thanked for patience.        "
To get better and follow your care plan as instructed.

## 2023-06-11 ENCOUNTER — HOSPITAL ENCOUNTER (EMERGENCY)
Facility: MEDICAL CENTER | Age: 28
End: 2023-06-11
Attending: EMERGENCY MEDICINE
Payer: COMMERCIAL

## 2023-06-11 VITALS
TEMPERATURE: 97.9 F | BODY MASS INDEX: 26.66 KG/M2 | DIASTOLIC BLOOD PRESSURE: 58 MMHG | HEIGHT: 65 IN | SYSTOLIC BLOOD PRESSURE: 109 MMHG | RESPIRATION RATE: 18 BRPM | OXYGEN SATURATION: 92 % | WEIGHT: 160 LBS | HEART RATE: 58 BPM

## 2023-06-11 DIAGNOSIS — R10.2 PELVIC PAIN: ICD-10-CM

## 2023-06-11 DIAGNOSIS — N39.0 URINARY TRACT INFECTION WITHOUT HEMATURIA, SITE UNSPECIFIED: ICD-10-CM

## 2023-06-11 LAB
ALBUMIN SERPL BCP-MCNC: 4.4 G/DL (ref 3.2–4.9)
ALBUMIN/GLOB SERPL: 1.8 G/DL
ALP SERPL-CCNC: 62 U/L (ref 30–99)
ALT SERPL-CCNC: 13 U/L (ref 2–50)
ANION GAP SERPL CALC-SCNC: 14 MMOL/L (ref 7–16)
APPEARANCE UR: CLEAR
AST SERPL-CCNC: 12 U/L (ref 12–45)
BACTERIA #/AREA URNS HPF: ABNORMAL /HPF
BASOPHILS # BLD AUTO: 0.5 % (ref 0–1.8)
BASOPHILS # BLD: 0.06 K/UL (ref 0–0.12)
BILIRUB SERPL-MCNC: 0.3 MG/DL (ref 0.1–1.5)
BILIRUB UR QL STRIP.AUTO: NEGATIVE
BUN SERPL-MCNC: 10 MG/DL (ref 8–22)
CALCIUM ALBUM COR SERPL-MCNC: 8.6 MG/DL (ref 8.5–10.5)
CALCIUM SERPL-MCNC: 8.9 MG/DL (ref 8.5–10.5)
CHLORIDE SERPL-SCNC: 107 MMOL/L (ref 96–112)
CO2 SERPL-SCNC: 20 MMOL/L (ref 20–33)
COLOR UR: YELLOW
CREAT SERPL-MCNC: 0.65 MG/DL (ref 0.5–1.4)
EOSINOPHIL # BLD AUTO: 0.11 K/UL (ref 0–0.51)
EOSINOPHIL NFR BLD: 0.9 % (ref 0–6.9)
EPI CELLS #/AREA URNS HPF: NEGATIVE /HPF
ERYTHROCYTE [DISTWIDTH] IN BLOOD BY AUTOMATED COUNT: 41.6 FL (ref 35.9–50)
GFR SERPLBLD CREATININE-BSD FMLA CKD-EPI: 121 ML/MIN/1.73 M 2
GLOBULIN SER CALC-MCNC: 2.5 G/DL (ref 1.9–3.5)
GLUCOSE SERPL-MCNC: 105 MG/DL (ref 65–99)
GLUCOSE UR STRIP.AUTO-MCNC: NEGATIVE MG/DL
HCG SERPL QL: NEGATIVE
HCT VFR BLD AUTO: 42.6 % (ref 37–47)
HGB BLD-MCNC: 14.6 G/DL (ref 12–16)
HYALINE CASTS #/AREA URNS LPF: ABNORMAL /LPF
IMM GRANULOCYTES # BLD AUTO: 0.07 K/UL (ref 0–0.11)
IMM GRANULOCYTES NFR BLD AUTO: 0.5 % (ref 0–0.9)
KETONES UR STRIP.AUTO-MCNC: ABNORMAL MG/DL
LEUKOCYTE ESTERASE UR QL STRIP.AUTO: ABNORMAL
LYMPHOCYTES # BLD AUTO: 1.48 K/UL (ref 1–4.8)
LYMPHOCYTES NFR BLD: 11.5 % (ref 22–41)
MCH RBC QN AUTO: 30.4 PG (ref 27–33)
MCHC RBC AUTO-ENTMCNC: 34.3 G/DL (ref 32.2–35.5)
MCV RBC AUTO: 88.6 FL (ref 81.4–97.8)
MICRO URNS: ABNORMAL
MONOCYTES # BLD AUTO: 0.58 K/UL (ref 0–0.85)
MONOCYTES NFR BLD AUTO: 4.5 % (ref 0–13.4)
NEUTROPHILS # BLD AUTO: 10.57 K/UL (ref 1.82–7.42)
NEUTROPHILS NFR BLD: 82.1 % (ref 44–72)
NITRITE UR QL STRIP.AUTO: NEGATIVE
NRBC # BLD AUTO: 0 K/UL
NRBC BLD-RTO: 0 /100 WBC (ref 0–0.2)
PH UR STRIP.AUTO: 5 [PH] (ref 5–8)
PLATELET # BLD AUTO: 227 K/UL (ref 164–446)
PMV BLD AUTO: 11.5 FL (ref 9–12.9)
POTASSIUM SERPL-SCNC: 4.1 MMOL/L (ref 3.6–5.5)
PROT SERPL-MCNC: 6.9 G/DL (ref 6–8.2)
PROT UR QL STRIP: NEGATIVE MG/DL
RBC # BLD AUTO: 4.81 M/UL (ref 4.2–5.4)
RBC # URNS HPF: ABNORMAL /HPF
RBC UR QL AUTO: ABNORMAL
SODIUM SERPL-SCNC: 141 MMOL/L (ref 135–145)
SP GR UR STRIP.AUTO: 1.02
UROBILINOGEN UR STRIP.AUTO-MCNC: 0.2 MG/DL
WBC # BLD AUTO: 12.9 K/UL (ref 4.8–10.8)
WBC #/AREA URNS HPF: ABNORMAL /HPF

## 2023-06-11 PROCEDURE — 700105 HCHG RX REV CODE 258: Performed by: EMERGENCY MEDICINE

## 2023-06-11 PROCEDURE — 80053 COMPREHEN METABOLIC PANEL: CPT

## 2023-06-11 PROCEDURE — 84703 CHORIONIC GONADOTROPIN ASSAY: CPT

## 2023-06-11 PROCEDURE — 81001 URINALYSIS AUTO W/SCOPE: CPT

## 2023-06-11 PROCEDURE — 700111 HCHG RX REV CODE 636 W/ 250 OVERRIDE (IP): Performed by: EMERGENCY MEDICINE

## 2023-06-11 PROCEDURE — 96374 THER/PROPH/DIAG INJ IV PUSH: CPT

## 2023-06-11 PROCEDURE — 85025 COMPLETE CBC W/AUTO DIFF WBC: CPT

## 2023-06-11 PROCEDURE — 36415 COLL VENOUS BLD VENIPUNCTURE: CPT

## 2023-06-11 PROCEDURE — 99285 EMERGENCY DEPT VISIT HI MDM: CPT

## 2023-06-11 RX ORDER — SODIUM CHLORIDE, SODIUM LACTATE, POTASSIUM CHLORIDE, CALCIUM CHLORIDE 600; 310; 30; 20 MG/100ML; MG/100ML; MG/100ML; MG/100ML
1000 INJECTION, SOLUTION INTRAVENOUS ONCE
Status: COMPLETED | OUTPATIENT
Start: 2023-06-11 | End: 2023-06-11

## 2023-06-11 RX ORDER — NITROFURANTOIN 25; 75 MG/1; MG/1
100 CAPSULE ORAL 2 TIMES DAILY
Qty: 10 CAPSULE | Refills: 0 | Status: ACTIVE | OUTPATIENT
Start: 2023-06-11 | End: 2023-06-16

## 2023-06-11 RX ORDER — KETOROLAC TROMETHAMINE 30 MG/ML
30 INJECTION, SOLUTION INTRAMUSCULAR; INTRAVENOUS ONCE
Status: COMPLETED | OUTPATIENT
Start: 2023-06-11 | End: 2023-06-11

## 2023-06-11 RX ADMIN — SODIUM CHLORIDE, POTASSIUM CHLORIDE, SODIUM LACTATE AND CALCIUM CHLORIDE 1000 ML: 600; 310; 30; 20 INJECTION, SOLUTION INTRAVENOUS at 11:22

## 2023-06-11 RX ADMIN — KETOROLAC TROMETHAMINE 30 MG: 30 INJECTION, SOLUTION INTRAMUSCULAR; INTRAVENOUS at 09:35

## 2023-06-11 ASSESSMENT — PAIN DESCRIPTION - PAIN TYPE: TYPE: ACUTE PAIN

## 2023-06-11 NOTE — ED NOTES
Message sent to pharmacy in hopes to validate medication as pt not pregnant. Still waiting for verification.

## 2023-06-11 NOTE — ED NOTES
Pt requesting IV fluids before discharge for feeling dehydrated. ERP notified, orders received, IV fluids infusing.

## 2023-06-11 NOTE — ED PROVIDER NOTES
"ED Provider Note    CHIEF COMPLAINT  Chief Complaint   Patient presents with    Low Back Pain     Pt BIB EMS from [facility] between [place] and [place]. PT was on the way to [place] when she started experiencing excruciating pain to low back and flanks. Pt has hx frequent UTI's. She started experiencing these symptoms yesterday so took a dose of abx from a prior UTI.        EXTERNAL RECORDS REVIEWED  Patient's last encounter was an outpatient visit in the urgent care in July of this year she was treated for a sinus infection with Augmentin    HPI/ROS  LIMITATION TO HISTORY   Select: : None  OUTSIDE HISTORIAN(S):  Parent mother    Veronica Reyes is a 28 y.o. female who presents accompanied by her mother.  She presents via EMS.  She was treated with Zofran and fentanyl in route.  Patient presents with low back pain and pelvic pain.  She states \"is not really abdominal pain, it is lower\".  She has had this 1 time in the past associated with an allergy to an antibiotic, Bactrim.  She reports a frequent history of urinary tract infections.  About a month ago, she developed symptoms and had a UA with her PCP who prescribed antibiotics.  Symptoms went away on their own so she did not take the course of antibiotics.  Symptoms returned yesterday so she started the antibiotic.  It was Macrobid.  She has tolerated Macrobid in the past without difficulty.  However, this particular prescription, the pharmacy stated that they did not have enough to fill the prescription with the brand name so she was given a few doses of the generic.  This is what she took yesterday and subsequently developed symptoms.  She denies any vaginal discharge or irritation.  No vaginal bleeding.  She denies the possibility of pregnancy.  Her last menstrual period was 2 weeks ago.  She has not had a rash.  She has had a full body rash related to morphine in the past.  She reports a hospitalization for 3 days after treatment with Bactrim because she had an " associated hepatitis.  No fever.  No nausea or vomiting.  No recent trauma or falls.  No chest pain or shortness of breath.    PAST MEDICAL HISTORY   has a past medical history of Acne, Chlamydia infection (8/15/11), Dyslipidemia (4/29/2019), Hepatitis (2017), Herpetic karin (2016), Migraine, Thyroid disease, and Urinary tract infection.    SURGICAL HISTORY  patient denies any surgical history    FAMILY HISTORY  Family History   Problem Relation Age of Onset    Genetic Disorder Father         epilepsy/migraines    Other Father         kidney stones    Cancer Father         skin cancer    Cancer Maternal Grandfather         skin cancer    Cancer Paternal Grandfather         colon    Heart Disease Paternal Grandfather         CAD    Hypertension Paternal Grandfather     Diabetes Neg Hx     Stroke Neg Hx        SOCIAL HISTORY  Social History     Tobacco Use    Smoking status: Never    Smokeless tobacco: Never   Vaping Use    Vaping Use: Never used   Substance and Sexual Activity    Alcohol use: Yes     Comment: 2 times weekly    Drug use: Yes     Types: Marijuana     Comment: once a week edibles for sleep.    Sexual activity: Yes     Partners: Male     Birth control/protection: Condom     Comment: Lives with her boyfriend. Boyfriend son lives week on and week off. Planning pregnancy. Works full time . Loves her job. No social or domestic concerns and has supportive partner.       CURRENT MEDICATIONS  Home Medications       Reviewed by Kate Vaz R.N. (Registered Nurse) on 06/11/23 at 0814  Med List Status: Not Addressed     Medication Last Dose Status   acyclovir (ZOVIRAX) 400 MG tablet  Active                    ALLERGIES  Allergies   Allergen Reactions    Bactrim Vomiting     hepatitis    Imitrex [Sumatriptan Succinate] Shortness of Breath    Morphine Sulfate Itching    Tdap [Dipth, Acell Pertus, Tetanus] Unspecified     seizure       PHYSICAL EXAM  VITAL SIGNS: /58   Pulse (!) 58   Temp  "36.6 °C (97.9 °F) (Temporal)   Resp 18   Ht 1.651 m (5' 5\")   Wt 72.6 kg (160 lb)   SpO2 92%   BMI 26.63 kg/m²    Vitals reviewed.  Constitutional: Patient is oriented to person, place, and time. Appears well-developed and well-nourished.Moderate distress.  Tearful.  Head: Normocephalic and atraumatic.   Ears: Normal external ears bilaterally.   Mouth/Throat: Oropharynx is clear and moist.  Eyes: Conjunctivae are normal. Pupils are equal, round.  Neck: Normal range of motion. Neck supple.   Cardiovascular: Normal rate, regular rhythm and normal heart sounds.   Pulmonary/Chest: Effort normal and breath sounds normal. No respiratory distress, no wheezes, rhonchi, or rales.   Abdominal: Soft. Bowel sounds are normal. There is suprapubic tenderness. No rebound or guarding, or peritoneal signs, no masses. No CVA tenderness.  Musculoskeletal: No edema and no tenderness.   Neurological: Patient is alert and oriented to person, place, and time. No cranial nerve deficits. Normal motor and sensory exam. No focal deficits.   Skin: Skin is warm and dry. No erythema. No pallor.   Psychiatric: Patient has a normal mood and affect.     DIAGNOSTIC STUDIES / PROCEDURES  LABS  Results for orders placed or performed during the hospital encounter of 06/11/23   CBC WITH DIFFERENTIAL   Result Value Ref Range    WBC 12.9 (H) 4.8 - 10.8 K/uL    RBC 4.81 4.20 - 5.40 M/uL    Hemoglobin 14.6 12.0 - 16.0 g/dL    Hematocrit 42.6 37.0 - 47.0 %    MCV 88.6 81.4 - 97.8 fL    MCH 30.4 27.0 - 33.0 pg    MCHC 34.3 32.2 - 35.5 g/dL    RDW 41.6 35.9 - 50.0 fL    Platelet Count 227 164 - 446 K/uL    MPV 11.5 9.0 - 12.9 fL    Neutrophils-Polys 82.10 (H) 44.00 - 72.00 %    Lymphocytes 11.50 (L) 22.00 - 41.00 %    Monocytes 4.50 0.00 - 13.40 %    Eosinophils 0.90 0.00 - 6.90 %    Basophils 0.50 0.00 - 1.80 %    Immature Granulocytes 0.50 0.00 - 0.90 %    Nucleated RBC 0.00 0.00 - 0.20 /100 WBC    Neutrophils (Absolute) 10.57 (H) 1.82 - 7.42 K/uL    " Lymphs (Absolute) 1.48 1.00 - 4.80 K/uL    Monos (Absolute) 0.58 0.00 - 0.85 K/uL    Eos (Absolute) 0.11 0.00 - 0.51 K/uL    Baso (Absolute) 0.06 0.00 - 0.12 K/uL    Immature Granulocytes (abs) 0.07 0.00 - 0.11 K/uL    NRBC (Absolute) 0.00 K/uL   CMP   Result Value Ref Range    Sodium 141 135 - 145 mmol/L    Potassium 4.1 3.6 - 5.5 mmol/L    Chloride 107 96 - 112 mmol/L    Co2 20 20 - 33 mmol/L    Anion Gap 14.0 7.0 - 16.0    Glucose 105 (H) 65 - 99 mg/dL    Bun 10 8 - 22 mg/dL    Creatinine 0.65 0.50 - 1.40 mg/dL    Calcium 8.9 8.5 - 10.5 mg/dL    AST(SGOT) 12 12 - 45 U/L    ALT(SGPT) 13 2 - 50 U/L    Alkaline Phosphatase 62 30 - 99 U/L    Total Bilirubin 0.3 0.1 - 1.5 mg/dL    Albumin 4.4 3.2 - 4.9 g/dL    Total Protein 6.9 6.0 - 8.2 g/dL    Globulin 2.5 1.9 - 3.5 g/dL    A-G Ratio 1.8 g/dL   URINALYSIS    Specimen: Urine, Clean Catch   Result Value Ref Range    Color Yellow     Character Clear     Specific Gravity 1.022 <1.035    Ph 5.0 5.0 - 8.0    Glucose Negative Negative mg/dL    Ketones Trace (A) Negative mg/dL    Protein Negative Negative mg/dL    Bilirubin Negative Negative    Urobilinogen, Urine 0.2 Negative    Nitrite Negative Negative    Leukocyte Esterase Small (A) Negative    Occult Blood Trace (A) Negative    Micro Urine Req Microscopic    HCG QUAL SERUM   Result Value Ref Range    Beta-Hcg Qualitative Serum Negative Negative   CORRECTED CALCIUM   Result Value Ref Range    Correct Calcium 8.6 8.5 - 10.5 mg/dL   ESTIMATED GFR   Result Value Ref Range    GFR (CKD-EPI) 121 >60 mL/min/1.73 m 2   URINE MICROSCOPIC (W/UA)   Result Value Ref Range    WBC 20-50 (A) /hpf    RBC 0-2 /hpf    Bacteria Few (A) None /hpf    Epithelial Cells Negative /hpf    Hyaline Cast 3-5 (A) /lpf       COURSE & MEDICAL DECISION MAKING    ED Observation Status? Yes; I am placing the patient in to an observation status due to a diagnostic uncertainty as well as therapeutic intensity. Patient placed in observation status at 8:49  AM, 6/11/2023.     Observation plan is as follows: Due to diagnostic uncertainty, possible need for further advanced imaging, pending lab results and serial abdominal exams, patient's placed in ED observation.    Upon Reevaluation, the patient's condition has: Improved; and will be discharged.    Patient discharged from ED Observation status at 11:40 AM (Time) June 11, 2023 (Date).     INITIAL ASSESSMENT, COURSE AND PLAN  Care Narrative:   This is a pleasant 28-year-old female.  She presents via EMS.  She is accompanied by her mother.  She is feeling better after medicated by EMS but notes that the pain is starting to come back.'s morning, she reports being doubled over in pain while driving.  History of prior similar symptoms related to an allergy to Bactrim.  She recently started a course of Macrobid which she has tolerated in the past but is concerned about possibility of generic form that she was given by pharmacy that she has not had in the past based on the color and shape of the pill.  I have ordered labs including a CBC and chemistry as well as urine analysis and, urine pregnancy test.    10:42 AM data reviewed patient has an elevated white blood cell count of 12.9.  H&H 14 and 42.  There is a neutrophilic shift but the remainder of the differential is unrevealing.  Chemistry shows an elevated glucose of 105 but his LFTs are normal as is the remainder of her CMP.  Urine analysis shows trace ketones, occult blood, small leukocyte esterase, elevated WBCs of 20-50 and few bacteria but no nitrites.    1055AM patient was reevaluated at the bedside.  She is feeling better.  She is resting.  She is requesting IV fluids because she states she feels dehydrated.  We went over her labs together.  They are overall reassuring.  Given her lower abdominal pain, urine analysis findings combined with dysuria and urinary frequency and a history of frequent UTIs, I have suggested that she be on antibiotics.  She is concerned  about the prescription that she currently has and does not want to take the generic form.  She will be given an additional prescription for Macrobid on discharge and I have indicated on this prescription, but the patient has not tolerated the generic formulation in the past.  She is overall well-appearing and nontoxic with reassuring vital signs.  I anticipate discharge to home once IV fluids are infused.    HYDRATION: Based on the patient's presentation of Dehydration the patient was given IV fluids. IV Hydration was used because oral hydration was not adequate alone. Upon recheck following hydration, the patient was improved.      DISPOSITION AND DISCUSSIONS  I have discussed management of the patient with the following physicians and THEE's: None    Discussion of management with other QHP or appropriate source(s): None    Escalation of care considered, and ultimately not performed:diagnostic imaging    Barriers to care at this time, including but not limited to: None.     Decision tools and prescription drugs considered including, but not limited to: None.    FINAL DIAGNOSIS  1. Pelvic pain    2. Urinary tract infection without hematuria, site unspecified           Electronically signed by: Katina Pimentel D.O., 6/11/2023 8:44 AM

## 2023-06-11 NOTE — ED NOTES
"Pt discharged home. IV discontinued and gauze placed, pt in possession of belongings. Pt provided discharge education and information pertaining to medications and follow up appointments. Pt received copy of discharge instructions and verbalized understanding. /58   Pulse (!) 58   Temp 36.6 °C (97.9 °F) (Temporal)   Resp 18   Ht 1.651 m (5' 5\")   Wt 72.6 kg (160 lb)   SpO2 92%   BMI 26.63 kg/m²    "

## 2023-06-11 NOTE — ED TRIAGE NOTES
Chief Complaint   Patient presents with    Low Back Pain     Pt BIB EMS from [facility] stop between [facility]. PT was on the way to [place] when she started experiencing excruciating pain to low back and flanks. Pt has hx frequent UTI's. She started experiencing these symptoms yesterday so took a dose of abx from a prior UTI.      PT also reports some nausea however she things it's the pain that's so bad making her nauseous. Received 2 doses 100mg fent on the way from EMS along with 4 Zofran.

## 2023-06-11 NOTE — ED NOTES
Pt medicated per MAR.   Ambulatory to restroom and back to [facility] without issues.   Urine sample provided and sent.

## 2023-06-11 NOTE — ED NOTES
Phone call made to lab as Hcg not noted as processing yet. Lab states they will check with processor and that results should be up within 5 mins from processing. Pt updated on process and reassured we should be able to administer medication soon.

## 2023-06-11 NOTE — ED NOTES
Pt settled into gurney, connected to vitals machine and given warm blankets.   Labs collected and sent.   Pt given urine cup and urged to pee as soon as possible to get sample processing. Urine cup left at bedside.   Chart up for ERP review.

## 2024-07-17 ENCOUNTER — RESEARCH ENCOUNTER (OUTPATIENT)
Dept: RESEARCH | Facility: MEDICAL CENTER | Age: 29
End: 2024-07-17
Payer: COMMERCIAL